# Patient Record
Sex: FEMALE | Race: WHITE | NOT HISPANIC OR LATINO | Employment: OTHER | URBAN - NONMETROPOLITAN AREA
[De-identification: names, ages, dates, MRNs, and addresses within clinical notes are randomized per-mention and may not be internally consistent; named-entity substitution may affect disease eponyms.]

---

## 2017-02-27 ENCOUNTER — TRANSCRIBE ORDERS (OUTPATIENT)
Dept: ADMINISTRATIVE | Facility: HOSPITAL | Age: 62
End: 2017-02-27

## 2017-02-27 DIAGNOSIS — R10.11 RIGHT UPPER QUADRANT PAIN: Primary | ICD-10-CM

## 2017-03-08 ENCOUNTER — TELEPHONE (OUTPATIENT)
Dept: MAMMOGRAPHY | Facility: HOSPITAL | Age: 62
End: 2017-03-08

## 2017-03-10 ENCOUNTER — HOSPITAL ENCOUNTER (OUTPATIENT)
Dept: ULTRASOUND IMAGING | Facility: HOSPITAL | Age: 62
Discharge: HOME | End: 2017-03-10
Admitting: PHYSICIAN ASSISTANT

## 2017-03-10 DIAGNOSIS — R10.11 RIGHT UPPER QUADRANT PAIN: ICD-10-CM

## 2017-03-10 PROCEDURE — 76705 ECHO EXAM OF ABDOMEN: CPT | Performed by: RADIOLOGY

## 2017-03-10 PROCEDURE — 76705 ECHO EXAM OF ABDOMEN: CPT

## 2017-07-26 RX ORDER — NAPROXEN 500 MG/1
TABLET ORAL
Refills: 0 | Status: ON HOLD | COMMUNITY
Start: 2017-05-15 | End: 2018-03-01

## 2017-07-26 RX ORDER — TIZANIDINE 4 MG/1
4 TABLET ORAL EVERY 8 HOURS PRN
Refills: 0 | COMMUNITY
Start: 2017-04-22

## 2017-07-27 ENCOUNTER — OFFICE VISIT (OUTPATIENT)
Dept: NEUROSURGERY | Facility: CLINIC | Age: 62
End: 2017-07-27

## 2017-07-27 VITALS
DIASTOLIC BLOOD PRESSURE: 95 MMHG | WEIGHT: 193 LBS | RESPIRATION RATE: 18 BRPM | BODY MASS INDEX: 31.02 KG/M2 | OXYGEN SATURATION: 97 % | SYSTOLIC BLOOD PRESSURE: 145 MMHG | HEIGHT: 66 IN | TEMPERATURE: 98.4 F | HEART RATE: 82 BPM

## 2017-07-27 DIAGNOSIS — M50.30 DEGENERATIVE DISC DISEASE, CERVICAL: Primary | ICD-10-CM

## 2017-07-27 PROCEDURE — 99203 OFFICE O/P NEW LOW 30 MIN: CPT | Performed by: NEUROLOGICAL SURGERY

## 2017-07-27 RX ORDER — CHOLECALCIFEROL (VITAMIN D3) 125 MCG
5 CAPSULE ORAL NIGHTLY
COMMUNITY

## 2017-07-27 RX ORDER — NABUMETONE 750 MG/1
750 TABLET, FILM COATED ORAL 2 TIMES DAILY
Qty: 60 TABLET | Refills: 0 | Status: ON HOLD | OUTPATIENT
Start: 2017-07-27 | End: 2018-03-01

## 2017-07-27 NOTE — PROGRESS NOTES
Annette Suarez  1955  [id number]      Chief Complaint   Patient presents with   • Neck Pain       HISTORY OF PRESENT ILLNESS:  [This is a 62-year-old who presents with a genetic history of degenerative osteoarthritis complaining of pain in the cervical area which is axial and nonradicular and not that associated with spinal cord or nerve root compression.  In the past she went to physical therapy which helped only temporarily.  CT scan was repeated and she is referred for neurosurgical consultation. ]    Past Medical History:   Diagnosis Date   • Arthritis    • Asthma    • Diabetic peripheral neuropathy    • Esophageal reflux    • Gout    • Hyperlipidemia    • Hypertension        Past Surgical History:   Procedure Laterality Date   •  SECTION     • HYSTERECTOMY         Family History   Problem Relation Age of Onset   • Diabetes Other    • Hypertension Other        Social History     Social History   • Marital status: Single     Spouse name: N/A   • Number of children: N/A   • Years of education: N/A     Occupational History   • Not on file.     Social History Main Topics   • Smoking status: Former Smoker   • Smokeless tobacco: Not on file   • Alcohol use No   • Drug use: No   • Sexual activity: Defer     Other Topics Concern   • Not on file     Social History Narrative       Allergies   Allergen Reactions   • Meperidine    • Morphine          Current Outpatient Prescriptions:   •  allopurinol (ZYLOPRIM) 100 MG tablet, take 2 tablets by mouth once daily, Disp: , Rfl: 0  •  aspirin 81 MG tablet, Take  by mouth., Disp: , Rfl:   •  cloNIDine (CATAPRES) 0.1 MG tablet, , Disp: , Rfl: 0  •  furosemide (LASIX) 20 MG tablet, take 1 tablet by mouth once daily, Disp: , Rfl: 0  •  losartan (COZAAR) 100 MG tablet, Take  by mouth., Disp: , Rfl:   •  melatonin 5 MG tablet tablet, Take 5 mg by mouth., Disp: , Rfl:   •  metFORMIN (GLUCOPHAGE) 500 MG tablet, Take  by mouth., Disp: , Rfl:   •  naproxen (NAPROSYN) 500  MG tablet, take 1 tablet by mouth twice a day with food, Disp: , Rfl: 0  •  tiZANidine (ZANAFLEX) 2 MG tablet, , Disp: , Rfl: 0  •  allopurinol (ZYLOPRIM) 300 MG tablet, Take  by mouth., Disp: , Rfl:   •  atenolol (TENORMIN) 50 MG tablet, Take  by mouth., Disp: , Rfl:   •  beclomethasone (QVAR) 80 MCG/ACT inhaler, Qvar AERS; Patient Sig: Qvar AERS ; 0; 13-Nov-2015; Active, Disp: , Rfl:   •  cetirizine (ZyrTEC) 10 MG tablet, Take  by mouth., Disp: , Rfl:   •  diclofenac (VOLTAREN) 1 % gel gel, apply to affected area three times a day if needed, Disp: , Rfl: 0  •  diclofenac (VOLTAREN) 75 MG EC tablet, , Disp: , Rfl: 0  •  diltiazem SR (CARDIZEM SR) 120 MG 12 hr capsule, take 1 capsule by mouth twice a day, Disp: , Rfl: 0  •  fenofibrate micronized (ANTARA) 130 MG capsule, Take  by mouth., Disp: , Rfl:   •  fluticasone (FLONASE) 50 MCG/ACT nasal spray, into each nostril., Disp: , Rfl:   •  glimepiride (AMARYL) 1 MG tablet, Take  by mouth., Disp: , Rfl:   •  HYDROcodone-acetaminophen (NORCO) 5-325 MG per tablet, take 1 tablet by mouth every 6 hours if needed, Disp: , Rfl: 0  •  hydrOXYzine (VISTARIL) 25 MG capsule, Take  by mouth., Disp: , Rfl:   •  ibuprofen (ADVIL,MOTRIN) 800 MG tablet, Take  by mouth., Disp: , Rfl:   •  indomethacin (INDOCIN) 25 MG capsule, Take  by mouth., Disp: , Rfl:   •  PredniSONE (DELTASONE) 10 MG (21) tablet pack, Take  by mouth., Disp: , Rfl:   •  ranitidine (ZANTAC) 150 MG tablet, Take  by mouth., Disp: , Rfl:   •  tiZANidine (ZANAFLEX) 4 MG tablet, , Disp: , Rfl: 0  •  TiZANidine (ZANAFLEX) 6 MG capsule, Take  by mouth., Disp: , Rfl:     Review of Systems   Constitutional: Positive for chills.   HENT: Positive for hearing loss, postnasal drip, rhinorrhea, sinus pressure and sneezing.    Eyes: Positive for photophobia and itching.   Respiratory: Positive for shortness of breath.    Cardiovascular: Positive for palpitations.   Endocrine: Positive for cold intolerance.   Musculoskeletal:  Positive for arthralgias, back pain, neck pain and neck stiffness.   Allergic/Immunologic: Positive for environmental allergies.   Hematological: Bruises/bleeds easily.   Psychiatric/Behavioral: Positive for agitation.   All other systems reviewed and are negative.      There were no vitals filed for this visit.    Neurological Examination:      Mental status/speech: The patient is alert and oriented.  Speech is clear without aphysia or dysarthria.  No overt cognitive deficits.    Cranial nerve examination:    Olfaction: Smell is intact.  Vision: Vision is intact without visual field abnormalities.  Funduscopic examination is normal.  No pupillary irregularity.  Ocular motor examination: The extraocular muscles are intact.  There is no diplopia.  The pupil is round and reactive to both light and accommodation.  There is no nystagmus.  Facial movement/sensation: There is no facial weakness.  Sensation is intact in the first, second, and third divisions of the trigeminal nerve.  The corneal reflex is intact.  Auditory: Hearing is intact to finger rub bilaterally.  Cranial nerves IX, X, XI, XII: Phonation is normal.  No dysphagia.  Tongue is protruded in the midline without atrophy.  The gag reflex is intact.  Shoulder shrug is normal.    Musculoligamentous ligamentous examination: [She had decreased range of motion of cervical spine.  However there is no weakness sensory loss or reflex asymmetry.  Her gait is normal.  No Babinski Inocencia or clonus.  Straight leg raising [place] and flip test are negative. ]      Medical Decision Making:     Diagnostic Data Set:  Cervical MRI data set shows multilevel disc degeneration presenting as bulge of intravertebral disc without significant compromise of the canal or neuroforamen.  Although she has significant degenerative changes at his generalized diffuse and not focal.      Assessment:  Diffuse degenerative osteoarthritis of the cervical spine          Recommendations:   Surgery is not indicated.  She has been to physical therapy in the past and states it helped but only temporarily.  Therefore revisit to physical therapy may not be in her best interest.  I have given her prescription of Relafen 750 mg twice a day to see if this NSAID will help.  Unfortunately little else to offer her other than alteration of her medications.  Referral to pain management is a consideration for facet or epidural steroid blocks.        I greatly appreciate the opportunity to see and evaluate this individual.  If you have questions or concerns regarding issues that I may have overlooked please call me at any time: [phone].  Moshe Thomas M.D.  [facility]  [address] [place] [address].  [facility]  [id number]

## 2017-08-25 ENCOUNTER — TELEPHONE (OUTPATIENT)
Dept: NEUROSURGERY | Facility: CLINIC | Age: 62
End: 2017-08-25

## 2017-08-25 DIAGNOSIS — M54.2 CERVICALGIA: Primary | ICD-10-CM

## 2018-02-28 ENCOUNTER — HOSPITAL ENCOUNTER (INPATIENT)
Facility: HOSPITAL | Age: 63
LOS: 1 days | Discharge: HOME | End: 2018-03-04
Attending: INTERNAL MEDICINE | Admitting: INTERNAL MEDICINE

## 2018-02-28 DIAGNOSIS — I25.9 CHEST PAIN DUE TO MYOCARDIAL ISCHEMIA, UNSPECIFIED ISCHEMIC CHEST PAIN TYPE: Primary | ICD-10-CM

## 2018-03-01 ENCOUNTER — APPOINTMENT (OUTPATIENT)
Dept: CARDIOLOGY | Facility: HOSPITAL | Age: 63
End: 2018-03-01
Attending: INTERNAL MEDICINE

## 2018-03-01 ENCOUNTER — APPOINTMENT (OUTPATIENT)
Dept: GENERAL RADIOLOGY | Facility: HOSPITAL | Age: 63
End: 2018-03-01

## 2018-03-01 PROBLEM — R07.9 CHEST PAIN: Status: ACTIVE | Noted: 2018-03-01

## 2018-03-01 LAB
ALBUMIN SERPL-MCNC: 4.5 G/DL (ref 3.4–4.8)
ALBUMIN/GLOB SERPL: 1.6 G/DL (ref 1.5–2.5)
ALP SERPL-CCNC: 63 U/L (ref 35–104)
ALT SERPL W P-5'-P-CCNC: 16 U/L (ref 10–36)
ANION GAP SERPL CALCULATED.3IONS-SCNC: 9.1 MMOL/L (ref 3.6–11.2)
AST SERPL-CCNC: 17 U/L (ref 10–30)
BASOPHILS # BLD AUTO: 0.03 10*3/MM3 (ref 0–0.3)
BASOPHILS NFR BLD AUTO: 0.3 % (ref 0–2)
BH CV ECHO MEAS - % IVS THICK: 27 %
BH CV ECHO MEAS - % LVPW THICK: 48.8 %
BH CV ECHO MEAS - ACS: 2.1 CM
BH CV ECHO MEAS - AI DEC SLOPE: 148.3 CM/SEC^2
BH CV ECHO MEAS - AI MAX PG: 45.3 MMHG
BH CV ECHO MEAS - AI MAX VEL: 336.6 CM/SEC
BH CV ECHO MEAS - AI P1/2T: 665 MSEC
BH CV ECHO MEAS - AO MAX PG (FULL): 3.3 MMHG
BH CV ECHO MEAS - AO MAX PG: 7.5 MMHG
BH CV ECHO MEAS - AO MEAN PG (FULL): 1.6 MMHG
BH CV ECHO MEAS - AO MEAN PG: 3.9 MMHG
BH CV ECHO MEAS - AO ROOT AREA (BSA CORRECTED): 1.9
BH CV ECHO MEAS - AO ROOT AREA: 11.5 CM^2
BH CV ECHO MEAS - AO ROOT DIAM: 3.8 CM
BH CV ECHO MEAS - AO V2 MAX: 137.1 CM/SEC
BH CV ECHO MEAS - AO V2 MEAN: 90 CM/SEC
BH CV ECHO MEAS - AO V2 VTI: 26.3 CM
BH CV ECHO MEAS - AVA(I,A): 2.5 CM^2
BH CV ECHO MEAS - AVA(I,D): 2.5 CM^2
BH CV ECHO MEAS - AVA(V,A): 2.5 CM^2
BH CV ECHO MEAS - AVA(V,D): 2.5 CM^2
BH CV ECHO MEAS - BSA(HAYCOCK): 2.1 M^2
BH CV ECHO MEAS - BSA: 2 M^2
BH CV ECHO MEAS - BZI_BMI: 31.8 KILOGRAMS/M^2
BH CV ECHO MEAS - BZI_METRIC_HEIGHT: 167.6 CM
BH CV ECHO MEAS - BZI_METRIC_WEIGHT: 89.4 KG
BH CV ECHO MEAS - EDV(CUBED): 258.8 ML
BH CV ECHO MEAS - EDV(TEICH): 206.5 ML
BH CV ECHO MEAS - EF(CUBED): 69.8 %
BH CV ECHO MEAS - EF(TEICH): 60.3 %
BH CV ECHO MEAS - ESV(CUBED): 78.1 ML
BH CV ECHO MEAS - ESV(TEICH): 81.9 ML
BH CV ECHO MEAS - FS: 32.9 %
BH CV ECHO MEAS - IVS/LVPW: 0.86
BH CV ECHO MEAS - IVSD: 1.5 CM
BH CV ECHO MEAS - IVSS: 1.9 CM
BH CV ECHO MEAS - LA DIMENSION: 3.7 CM
BH CV ECHO MEAS - LA/AO: 0.96
BH CV ECHO MEAS - LV MASS(C)D: 521.2 GRAMS
BH CV ECHO MEAS - LV MASS(C)DI: 262.2 GRAMS/M^2
BH CV ECHO MEAS - LV MASS(C)S: 493.3 GRAMS
BH CV ECHO MEAS - LV MASS(C)SI: 248.2 GRAMS/M^2
BH CV ECHO MEAS - LV MAX PG: 4.3 MMHG
BH CV ECHO MEAS - LV MEAN PG: 2.3 MMHG
BH CV ECHO MEAS - LV V1 MAX: 103.2 CM/SEC
BH CV ECHO MEAS - LV V1 MEAN: 67.5 CM/SEC
BH CV ECHO MEAS - LV V1 VTI: 20 CM
BH CV ECHO MEAS - LVIDD: 6.4 CM
BH CV ECHO MEAS - LVIDS: 4.3 CM
BH CV ECHO MEAS - LVOT AREA (M): 3.1 CM^2
BH CV ECHO MEAS - LVOT AREA: 3.3 CM^2
BH CV ECHO MEAS - LVOT DIAM: 2 CM
BH CV ECHO MEAS - LVPWD: 1.7 CM
BH CV ECHO MEAS - LVPWS: 2.6 CM
BH CV ECHO MEAS - MV A MAX VEL: 104.6 CM/SEC
BH CV ECHO MEAS - MV DEC SLOPE: 314.2 CM/SEC^2
BH CV ECHO MEAS - MV E MAX VEL: 64.2 CM/SEC
BH CV ECHO MEAS - MV E/A: 0.61
BH CV ECHO MEAS - MV P1/2T MAX VEL: 64.7 CM/SEC
BH CV ECHO MEAS - MV P1/2T: 60.3 MSEC
BH CV ECHO MEAS - MVA P1/2T LCG: 3.4 CM^2
BH CV ECHO MEAS - MVA(P1/2T): 3.6 CM^2
BH CV ECHO MEAS - PA ACC SLOPE: 1077 CM/SEC^2
BH CV ECHO MEAS - PA ACC TIME: 0.1 SEC
BH CV ECHO MEAS - PA PR(ACCEL): 33.1 MMHG
BH CV ECHO MEAS - RAP SYSTOLE: 10 MMHG
BH CV ECHO MEAS - RVSP: 37.5 MMHG
BH CV ECHO MEAS - SI(AO): 152.6 ML/M^2
BH CV ECHO MEAS - SI(CUBED): 90.9 ML/M^2
BH CV ECHO MEAS - SI(LVOT): 32.8 ML/M^2
BH CV ECHO MEAS - SI(TEICH): 62.7 ML/M^2
BH CV ECHO MEAS - SV(AO): 303.3 ML
BH CV ECHO MEAS - SV(CUBED): 180.6 ML
BH CV ECHO MEAS - SV(LVOT): 65.3 ML
BH CV ECHO MEAS - SV(TEICH): 124.5 ML
BH CV ECHO MEAS - TR MAX VEL: 262 CM/SEC
BILIRUB SERPL-MCNC: 0.5 MG/DL (ref 0.2–1.8)
BNP SERPL-MCNC: 707 PG/ML (ref 0–100)
BUN BLD-MCNC: 26 MG/DL (ref 7–21)
BUN/CREAT SERPL: 21.8 (ref 7–25)
CALCIUM SPEC-SCNC: 9.8 MG/DL (ref 7.7–10)
CHLORIDE SERPL-SCNC: 103 MMOL/L (ref 99–112)
CHOLEST SERPL-MCNC: 238 MG/DL (ref 0–200)
CK MB SERPL-CCNC: 1.13 NG/ML (ref 0–5)
CK MB SERPL-CCNC: 1.26 NG/ML (ref 0–5)
CK MB SERPL-RTO: 1.7 % (ref 0–3)
CK SERPL-CCNC: 73 U/L (ref 24–173)
CK SERPL-CCNC: 76 U/L (ref 24–173)
CO2 SERPL-SCNC: 25.9 MMOL/L (ref 24.3–31.9)
CREAT BLD-MCNC: 1.19 MG/DL (ref 0.43–1.29)
DEPRECATED RDW RBC AUTO: 42.2 FL (ref 37–54)
EOSINOPHIL # BLD AUTO: 0.15 10*3/MM3 (ref 0–0.7)
EOSINOPHIL NFR BLD AUTO: 1.6 % (ref 0–5)
ERYTHROCYTE [DISTWIDTH] IN BLOOD BY AUTOMATED COUNT: 12.9 % (ref 11.5–14.5)
GFR SERPL CREATININE-BSD FRML MDRD: 46 ML/MIN/1.73
GLOBULIN UR ELPH-MCNC: 2.9 GM/DL
GLUCOSE BLD-MCNC: 95 MG/DL (ref 70–110)
GLUCOSE BLDC GLUCOMTR-MCNC: 90 MG/DL (ref 70–130)
GLUCOSE BLDC GLUCOMTR-MCNC: 93 MG/DL (ref 70–130)
HCT VFR BLD AUTO: 35.8 % (ref 37–47)
HDLC SERPL-MCNC: 42 MG/DL (ref 60–100)
HGB BLD-MCNC: 11.3 G/DL (ref 12–16)
IMM GRANULOCYTES # BLD: 0.03 10*3/MM3 (ref 0–0.03)
IMM GRANULOCYTES NFR BLD: 0.3 % (ref 0–0.5)
LDLC SERPL CALC-MCNC: 145 MG/DL (ref 0–100)
LDLC/HDLC SERPL: 3.45 {RATIO}
LYMPHOCYTES # BLD AUTO: 3.01 10*3/MM3 (ref 1–3)
LYMPHOCYTES NFR BLD AUTO: 32.3 % (ref 21–51)
MAXIMAL PREDICTED HEART RATE: 160 BPM
MCH RBC QN AUTO: 29.4 PG (ref 27–33)
MCHC RBC AUTO-ENTMCNC: 31.6 G/DL (ref 33–37)
MCV RBC AUTO: 93.2 FL (ref 80–94)
MONOCYTES # BLD AUTO: 0.76 10*3/MM3 (ref 0.1–0.9)
MONOCYTES NFR BLD AUTO: 8.1 % (ref 0–10)
NEUTROPHILS # BLD AUTO: 5.35 10*3/MM3 (ref 1.4–6.5)
NEUTROPHILS NFR BLD AUTO: 57.4 % (ref 30–70)
OSMOLALITY SERPL CALC.SUM OF ELEC: 280.2 MOSM/KG (ref 273–305)
PLATELET # BLD AUTO: 280 10*3/MM3 (ref 130–400)
PMV BLD AUTO: 10.5 FL (ref 6–10)
POTASSIUM BLD-SCNC: 3.7 MMOL/L (ref 3.5–5.3)
PROT SERPL-MCNC: 7.4 G/DL (ref 6–8)
RBC # BLD AUTO: 3.84 10*6/MM3 (ref 4.2–5.4)
SODIUM BLD-SCNC: 138 MMOL/L (ref 135–153)
STRESS TARGET HR: 136 BPM
TRIGL SERPL-MCNC: 255 MG/DL (ref 0–150)
TROPONIN I SERPL-MCNC: 0.06 NG/ML
TROPONIN I SERPL-MCNC: 0.07 NG/ML
TROPONIN I SERPL-MCNC: 0.09 NG/ML
TROPONIN I SERPL-MCNC: 0.1 NG/ML
TROPONIN I SERPL-MCNC: 0.12 NG/ML
VLDLC SERPL-MCNC: 51 MG/DL
WBC NRBC COR # BLD: 9.33 10*3/MM3 (ref 4.5–12.5)

## 2018-03-01 PROCEDURE — G0009 ADMIN PNEUMOCOCCAL VACCINE: HCPCS | Performed by: INTERNAL MEDICINE

## 2018-03-01 PROCEDURE — 94799 UNLISTED PULMONARY SVC/PX: CPT

## 2018-03-01 PROCEDURE — 90732 PPSV23 VACC 2 YRS+ SUBQ/IM: CPT | Performed by: INTERNAL MEDICINE

## 2018-03-01 PROCEDURE — 25010000002 PNEUMOCOCCAL VAC POLYVALENT PER 0.5 ML: Performed by: INTERNAL MEDICINE

## 2018-03-01 PROCEDURE — 93010 ELECTROCARDIOGRAM REPORT: CPT | Performed by: INTERNAL MEDICINE

## 2018-03-01 PROCEDURE — 80061 LIPID PANEL: CPT | Performed by: INTERNAL MEDICINE

## 2018-03-01 PROCEDURE — 82550 ASSAY OF CK (CPK): CPT | Performed by: INTERNAL MEDICINE

## 2018-03-01 PROCEDURE — 80053 COMPREHEN METABOLIC PANEL: CPT | Performed by: INTERNAL MEDICINE

## 2018-03-01 PROCEDURE — 74245: CPT

## 2018-03-01 PROCEDURE — 93306 TTE W/DOPPLER COMPLETE: CPT

## 2018-03-01 PROCEDURE — 86141 C-REACTIVE PROTEIN HS: CPT | Performed by: INTERNAL MEDICINE

## 2018-03-01 PROCEDURE — 82553 CREATINE MB FRACTION: CPT | Performed by: INTERNAL MEDICINE

## 2018-03-01 PROCEDURE — 85025 COMPLETE CBC W/AUTO DIFF WBC: CPT | Performed by: INTERNAL MEDICINE

## 2018-03-01 PROCEDURE — 83880 ASSAY OF NATRIURETIC PEPTIDE: CPT | Performed by: INTERNAL MEDICINE

## 2018-03-01 PROCEDURE — G0378 HOSPITAL OBSERVATION PER HR: HCPCS

## 2018-03-01 PROCEDURE — 74245 FL UPPER GI SINGLE CONTRAST SBFT: CPT | Performed by: RADIOLOGY

## 2018-03-01 PROCEDURE — 25010000002 ENOXAPARIN PER 10 MG: Performed by: INTERNAL MEDICINE

## 2018-03-01 PROCEDURE — 82962 GLUCOSE BLOOD TEST: CPT

## 2018-03-01 PROCEDURE — 93005 ELECTROCARDIOGRAM TRACING: CPT | Performed by: INTERNAL MEDICINE

## 2018-03-01 PROCEDURE — 84484 ASSAY OF TROPONIN QUANT: CPT | Performed by: INTERNAL MEDICINE

## 2018-03-01 RX ORDER — ASPIRIN 81 MG/1
81 TABLET ORAL DAILY
Status: CANCELLED | OUTPATIENT
Start: 2018-03-01

## 2018-03-01 RX ORDER — ALLOPURINOL 100 MG/1
100 TABLET ORAL DAILY
Status: CANCELLED | OUTPATIENT
Start: 2018-03-01

## 2018-03-01 RX ORDER — CLOPIDOGREL BISULFATE 75 MG/1
75 TABLET ORAL DAILY
Status: DISCONTINUED | OUTPATIENT
Start: 2018-03-01 | End: 2018-03-02

## 2018-03-01 RX ORDER — PANTOPRAZOLE SODIUM 40 MG/1
40 TABLET, DELAYED RELEASE ORAL
Status: DISCONTINUED | OUTPATIENT
Start: 2018-03-01 | End: 2018-03-02

## 2018-03-01 RX ORDER — ALLOPURINOL 100 MG/1
100 TABLET ORAL NIGHTLY
Status: DISCONTINUED | OUTPATIENT
Start: 2018-03-01 | End: 2018-03-04 | Stop reason: HOSPADM

## 2018-03-01 RX ORDER — ALLOPURINOL 100 MG/1
100 TABLET ORAL NIGHTLY
COMMUNITY

## 2018-03-01 RX ORDER — FLECAINIDE ACETATE 100 MG/1
100 TABLET ORAL 2 TIMES DAILY
COMMUNITY
End: 2018-03-04 | Stop reason: HOSPADM

## 2018-03-01 RX ORDER — FLECAINIDE ACETATE 50 MG/1
100 TABLET ORAL 2 TIMES DAILY
Status: DISCONTINUED | OUTPATIENT
Start: 2018-03-01 | End: 2018-03-02

## 2018-03-01 RX ORDER — LOSARTAN POTASSIUM 50 MG/1
100 TABLET ORAL DAILY
Status: DISCONTINUED | OUTPATIENT
Start: 2018-03-01 | End: 2018-03-04 | Stop reason: HOSPADM

## 2018-03-01 RX ORDER — TIZANIDINE 4 MG/1
4 TABLET ORAL EVERY 8 HOURS PRN
Status: DISCONTINUED | OUTPATIENT
Start: 2018-03-01 | End: 2018-03-04 | Stop reason: HOSPADM

## 2018-03-01 RX ORDER — NAPROXEN 500 MG/1
500 TABLET ORAL 2 TIMES DAILY PRN
COMMUNITY
End: 2018-03-04 | Stop reason: HOSPADM

## 2018-03-01 RX ORDER — NITROGLYCERIN 0.4 MG/1
0.4 TABLET SUBLINGUAL
Status: DISCONTINUED | OUTPATIENT
Start: 2018-03-01 | End: 2018-03-01 | Stop reason: SDUPTHER

## 2018-03-01 RX ORDER — LOSARTAN POTASSIUM 100 MG/1
100 TABLET ORAL DAILY
COMMUNITY

## 2018-03-01 RX ORDER — CLONIDINE HYDROCHLORIDE 0.1 MG/1
0.1 TABLET ORAL 2 TIMES DAILY
Status: DISCONTINUED | OUTPATIENT
Start: 2018-03-01 | End: 2018-03-02

## 2018-03-01 RX ORDER — NITROGLYCERIN 0.4 MG/1
0.4 TABLET SUBLINGUAL
Status: DISCONTINUED | OUTPATIENT
Start: 2018-03-01 | End: 2018-03-04 | Stop reason: HOSPADM

## 2018-03-01 RX ORDER — ASPIRIN 81 MG/1
81 TABLET, CHEWABLE ORAL DAILY
Status: DISCONTINUED | OUTPATIENT
Start: 2018-03-01 | End: 2018-03-04 | Stop reason: HOSPADM

## 2018-03-01 RX ORDER — NAPROXEN 250 MG/1
500 TABLET ORAL 2 TIMES DAILY PRN
Status: DISCONTINUED | OUTPATIENT
Start: 2018-03-01 | End: 2018-03-02

## 2018-03-01 RX ORDER — FUROSEMIDE 20 MG/1
20 TABLET ORAL DAILY
Status: DISCONTINUED | OUTPATIENT
Start: 2018-03-01 | End: 2018-03-04 | Stop reason: HOSPADM

## 2018-03-01 RX ADMIN — PANTOPRAZOLE SODIUM 40 MG: 40 TABLET, DELAYED RELEASE ORAL at 12:23

## 2018-03-01 RX ADMIN — FLECAINIDE ACETATE 100 MG: 50 TABLET ORAL at 10:02

## 2018-03-01 RX ADMIN — ALLOPURINOL 100 MG: 100 TABLET ORAL at 19:56

## 2018-03-01 RX ADMIN — LOSARTAN POTASSIUM 100 MG: 50 TABLET, FILM COATED ORAL at 12:23

## 2018-03-01 RX ADMIN — ASPIRIN 81 MG: 81 TABLET, CHEWABLE ORAL at 12:25

## 2018-03-01 RX ADMIN — FLECAINIDE ACETATE 100 MG: 50 TABLET ORAL at 19:56

## 2018-03-01 RX ADMIN — CLOPIDOGREL 75 MG: 75 TABLET, FILM COATED ORAL at 12:23

## 2018-03-01 RX ADMIN — CLONIDINE HYDROCHLORIDE 0.1 MG: 0.1 TABLET ORAL at 12:24

## 2018-03-01 RX ADMIN — METOPROLOL TARTRATE 25 MG: 25 TABLET, FILM COATED ORAL at 01:32

## 2018-03-01 RX ADMIN — ENOXAPARIN SODIUM 90 MG: 100 INJECTION SUBCUTANEOUS at 17:57

## 2018-03-01 RX ADMIN — NAPROXEN 500 MG: 250 TABLET ORAL at 20:00

## 2018-03-01 RX ADMIN — PNEUMOCOCCAL VACCINE POLYVALENT 0.5 ML
25; 25; 25; 25; 25; 25; 25; 25; 25; 25; 25; 25; 25; 25; 25; 25; 25; 25; 25; 25; 25; 25; 25 INJECTION, SOLUTION INTRAMUSCULAR; SUBCUTANEOUS at 12:31

## 2018-03-01 RX ADMIN — METFORMIN HYDROCHLORIDE 500 MG: 500 TABLET ORAL at 12:25

## 2018-03-01 RX ADMIN — CLONIDINE HYDROCHLORIDE 0.1 MG: 0.1 TABLET ORAL at 19:56

## 2018-03-01 RX ADMIN — METOPROLOL TARTRATE 25 MG: 25 TABLET, FILM COATED ORAL at 12:24

## 2018-03-01 RX ADMIN — FUROSEMIDE 20 MG: 20 TABLET ORAL at 12:24

## 2018-03-01 RX ADMIN — FLECAINIDE ACETATE 100 MG: 50 TABLET ORAL at 01:32

## 2018-03-01 RX ADMIN — ENOXAPARIN SODIUM 40 MG: 40 INJECTION SUBCUTANEOUS at 01:32

## 2018-03-01 RX ADMIN — TIZANIDINE 4 MG: 4 TABLET ORAL at 20:00

## 2018-03-01 RX ADMIN — ALLOPURINOL 100 MG: 100 TABLET ORAL at 01:33

## 2018-03-01 RX ADMIN — METOPROLOL TARTRATE 25 MG: 25 TABLET, FILM COATED ORAL at 19:56

## 2018-03-01 NOTE — PROGRESS NOTES
Discharge Planning Assessment   [place]     Patient Name: Annette Suarez  MRN: [id number]  Today's Date: 3/1/2018    Admit Date: 2/28/2018          Discharge Needs Assessment       03/01/18 1034    Living Environment    Lives With child(tato), dependent    Living Arrangements house    Home Accessibility no concerns    Stair Railings at Home none    Type of Financial/Environmental Concern none    Transportation Available car            Discharge Plan       03/01/18 1035    Case Management/Social Work Plan    Plan Pt admitted on 2/28/18.  SS received consult per nsg for discharge planning.  SS spoke with pt on this date.  Pt lives at home with handicap son and plans to return home at discharge.  Pt currently does not utilize home health or DME.  Pt's PCP is Dr. Adams.  Pt utilizes [facility].  Pt states no needs.  SS will follow and assist with discharge needs.     Patient/Family In Agreement With Plan yes        Discharge Placement     No information found                Demographic Summary       03/01/18 1034    Referral Information    Admission Type observation    Referral Source nursing    Reason For Consult discharge planning            Functional Status     None            Psychosocial     None            Abuse/Neglect     None            Legal     None            Substance Abuse     None            Patient Forms     None          Barbi Leyva

## 2018-03-01 NOTE — PLAN OF CARE
Problem: Patient Care Overview (Adult)  Goal: Plan of Care Review  Outcome: Ongoing (interventions implemented as appropriate)    Goal: Adult Individualization and Mutuality  Outcome: Ongoing (interventions implemented as appropriate)    Goal: Discharge Needs Assessment  Outcome: Ongoing (interventions implemented as appropriate)      Problem: Fall Risk (Adult)  Goal: Identify Related Risk Factors and Signs and Symptoms  Outcome: Ongoing (interventions implemented as appropriate)    Goal: Absence of Falls  Outcome: Ongoing (interventions implemented as appropriate)      Problem: Skin Integrity Impairment, Risk/Actual (Adult)  Goal: Identify Related Risk Factors and Signs and Symptoms  Outcome: Ongoing (interventions implemented as appropriate)    Goal: Skin Integrity/Wound Healing  Outcome: Ongoing (interventions implemented as appropriate)      Problem: Pain, Acute (Adult)  Goal: Identify Related Risk Factors and Signs and Symptoms  Outcome: Ongoing (interventions implemented as appropriate)    Goal: Acceptable Pain Control/Comfort Level  Outcome: Ongoing (interventions implemented as appropriate)      Problem: Diabetes, Type 2 (Adult)  Goal: Signs and Symptoms of Listed Potential Problems Will be Absent or Manageable (Diabetes, Type 2)  Outcome: Ongoing (interventions implemented as appropriate)

## 2018-03-01 NOTE — NURSING NOTE
Pt states she is scheduled for gallbladder sx on Wednesday, 3/7/18, with Justin Mccabe in [place].

## 2018-03-01 NOTE — PROGRESS NOTES
Primary care provider: Dr. Jonathan Kelly    Admitting physician-Dr. Lin    Chief complaint:    Chest tightness      History of present illness:      62-year-old woman has been admitted with history of chest tightness.    She developed chest pain yesterday morning which is retrosternal,, tightness, moderate in intensity, intermittent, with no definite aggravating or relieving factors and no radiation.  She visited the emergency department at [facility] from that she was transferred here for further care.  Her troponin level was indeterminate-0.06.  ECG showed normal sinus rhythm no evidence of ischemia.  After admission to discuss acidity, she remained chest pain-free.  No history of associated nausea or vomiting.  No history of diaphoresis.    Effort tolerance is good.  No history of PND, orthopnea or leg edema.  She has history of occasional palpitations due to PVCs.  No history of dizziness.    She has history of PVCs for which she takes flecainide.  No history of known coronary artery disease or CHF.  A nuclear stress test done in 8/2017 showed no evidence of ischemia and LVEF was normal.  Echocardiogram done 7/2017 showed normal LVEF and wall motion and no evidence of significant valvular heart disease.    Cardiac risk factors include history of DM type II, hypertension and hyperlipidemia.  History of COPD.  She quit smoking several years ago.  Recently diagnosed with a polyp in the gallbladder and been scheduled for gallbladder surgery next week by Dr. Mccabe [facility].        Review of Systems    Constitutional-fatigue    ENT-none  Cardiovascular-as above  Respiratooy- no symptoms.  History of COPD   GI-reflux disease   Musculoskeletal-gout and backache  Endocrine-diabetes and lipid disorder  Other organ system involvement-negative      Past Medical History:   Diagnosis Date   • Arthritis    • Asthma    • Diabetic peripheral neuropathy    • Esophageal reflux    • Gout    • Hyperlipidemia   "  • Hypertension    , Past Surgical History:   Procedure Laterality Date   •  SECTION     • HYSTERECTOMY     , Family History   Problem Relation Age of Onset   • Diabetes Other    • Hypertension Other    , Social History   Substance Use Topics   • Smoking status: Former Smoker   • Smokeless tobacco: Not on file   • Alcohol use No        Medication Review:    allopurinol (ZYLOPRIM) tablet 100 mg 100 mg, PO, Nightly Given: 133        aspirin chewable tablet 81 mg 81 mg, PO, Daily Ordered       CloNIDine (CATAPRES) tablet 0.1 mg 0.1 mg, PO, BID Ordered       enoxaparin (LOVENOX) syringe 40 mg 40 mg, SC, Q24H Given: 132       flecainide (TAMBOCOR) tablet 100 mg 100 mg, PO, BID Given: 132       furosemide (LASIX) tablet 20 mg 20 mg, PO, Daily Ordered       losartan (COZAAR) tablet 100 mg 100 mg, PO, Daily Ordered       metFORMIN (GLUCOPHAGE) tablet 500 mg 500 mg, PO, Daily With Breakfast Ordered       metoprolol tartrate (LOPRESSOR) tablet 25 mg 25 mg, PO, Q12H Given: 132       pneumococcal polysaccharide 23-valent (PNEUMOVAX-23) vaccine 0.5 mL 0.5 mL, IM, Once Ordered                 Allergies:  Meperidine; Morphine; and Adhesive tape     Past surgical history-hysterectomy and knee surgery     Social history-former smoker.  No history of fall: Or substance abuse     Family history-mother has history of heart disease       Objective     Vital Sign Min/Max for last 24 hours  Temp  Min: 98.1 °F (36.7 °C)  Max: 98.8 °F (37.1 °C)   BP  Min: 117/71  Max: 160/93   Pulse  Min: 65  Max: 91   Resp  Min: 18  Max: 20   SpO2  Min: 94 %  Max: 98 %   No Data Recorded   Weight  Min: 90.9 kg (200 lb 8 oz)  Max: 90.9 kg (200 lb 8 oz)     Flowsheet Rows         First Filed Value    Admission Height  167.6 cm (66\") Documented at 2018 0000    Admission Weight  90.9 kg (200 lb 8 oz) Documented at 2018 0000             Physical Exam:     General Appearance:    Alert, cooperative, in no acute " distress   Head:    Normocephalic, without obvious abnormality, atraumatic   Eyes:            Lids and lashes normal, conjunctivae and sclerae normal, no   icterus, no pallor, corneas clear, PERRLA   Ears:    Ears appear intact with no abnormalities noted   Throat:   No oral lesions, no thrush, oral mucosa moist   Neck:   No adenopathy, supple, trachea midline, no thyromegaly, no   carotid bruit, no JVD   Back:     No kyphosis present, no scoliosis present, no skin lesions,      erythema or scars, no tenderness to percussion or                   palpation,   range of motion normal   Lungs:     Clear to auscultation,respirations regular, even and                  unlabored    Heart:    Regular rhythm and normal rate, normal S1 and S2, no            murmur, no gallop, no rub, no click   Chest Wall:    No abnormalities observed   Abdomen:     Normal bowel sounds, no masses, no organomegaly, soft        non-tender, non-distended, no guarding, no rebound                tenderness   Rectal:     Deferred   Extremities:   Moves all extremities well, no edema, no cyanosis, no             redness   Pulses:   Pulses palpable and equal bilaterally   Skin:   No bleeding, bruising or rash       Neurologic:   Cranial nerves 2 - 12 grossly intact, sensation intact, DTR       present and equal bilaterally       Telemetry  Normal sinus rhythm    ECG  ECG/EMG Results (last 24 hours)     Procedure Component Value Units Date/Time    ECG 12 Lead [[id number]] Collected:  03/01/18 0032     Updated:  03/01/18 0033    Narrative:       Test Reason : new admit  Blood Pressure : **/** mmHG  Vent. Rate : 086 BPM     Atrial Rate : 086 BPM     P-R Int : 208 ms          QRS Dur : 102 ms      QT Int : 372 ms       P-R-T Axes : 063 041 064 degrees     QTc Int : 445 ms    Normal sinus rhythm  Possible Inferior infarct , age undetermined  Abnormal ECG  No previous ECGs available    Referred By:  STEPHANIE           Confirmed By:     ECG 12 Lead  [[id number]] Collected:  03/01/18 0657     Updated:  03/01/18 0659    Narrative:       Test Reason : chest pain  Blood Pressure : **/** mmHG  Vent. Rate : 067 BPM     Atrial Rate : 067 BPM     P-R Int : 208 ms          QRS Dur : 108 ms      QT Int : 430 ms       P-R-T Axes : 068 048 070 degrees     QTc Int : 454 ms    Normal sinus rhythm  Normal ECG  When compared with ECG of 01-MAR-2018 00:32, (Unconfirmed)  No significant change was found    Referred By:  STEPHANIE           Confirmed By:             Labs    Results from last 7 days  Lab Units 03/01/18  0050   WBC 10*3/mm3 9.33   HEMOGLOBIN g/dL 11.3*   HEMATOCRIT % 35.8*   PLATELETS 10*3/mm3 280       Results from last 7 days  Lab Units 03/01/18  0050   SODIUM mmol/L 138   POTASSIUM mmol/L 3.7   CHLORIDE mmol/L 103   CO2 mmol/L 25.9   BUN mg/dL 26*   CREATININE mg/dL 1.19   CALCIUM mg/dL 9.8   GLUCOSE mg/dL 95       Results from last 7 days  Lab Units 03/01/18  0050   BILIRUBIN mg/dL 0.5   ALK PHOS U/L 63   AST (SGOT) U/L 17   ALT (SGPT) U/L 16                   Results from last 7 days  Lab Units 03/01/18  0839 03/01/18  0729 03/01/18  0050 03/01/18  0026   CK TOTAL U/L  --  73 76  --    TROPONIN I ng/mL 0.118* 0.095*  --  0.064*   CK MB INDEX %  --  1.7  --   --            Imaging  [email or web address]        Assessment     1.  Chest pain.  Both typical and atypical features for angina .  Troponin I level is indeterminant.  Nuclear stress test done 8/2017 showed no evidence of ischemia.  Possibilities include GERD and gallbladder disease.  2.  PVCs-stable on flecainide therapy.  Less symptomatic  3.  Gallbladder disease-polyposis  4.  Hypertension  5.  Hyperlipidemia not take statin due to intolerance  6.  DM type II  7.  GERD    Plan    1.  Obtain serial cardiac markers and ECGs to rule out MI  2.  Resume her home medications as listed above  3.  Echocardiogram to evaluate left ventricular function and wall motion  4.  Upper GI series with esophagogram to rule out  GERD/hiatal hernia as cause of her chest pain.  5.  Resuscitation status-full code  6.  DVT prophylaxis-Lovenox 40 mg subcutaneous daily  7.  Start Protonix 40 mg by mouth daily    I discussed the patients findings and my recommendations with patient    Sheri Lin MD  03/01/18  9:30 AM       Cc: Dr. Jonathan Kelly

## 2018-03-01 NOTE — H&P
Sheri Lin MD Physician Signed Cardiology Progress Notes Date of Service: 3/1/2018  9:29 AM      Expand All Collapse All    []Hide copied text  Primary care provider: Dr. Jonathan Kelly     Admitting physician-Dr. Lin     Chief complaint:     Chest tightness        History of present illness:       62-year-old woman has been admitted with history of chest tightness.     She developed chest pain yesterday morning which is retrosternal,, tightness, moderate in intensity, intermittent, with no definite aggravating or relieving factors and no radiation.  She visited the emergency department at [facility] from that she was transferred here for further care.  Her troponin level was indeterminate-0.06.  ECG showed normal sinus rhythm no evidence of ischemia.  After admission to discuss acidity, she remained chest pain-free.  No history of associated nausea or vomiting.  No history of diaphoresis.     Effort tolerance is good.  No history of PND, orthopnea or leg edema.  She has history of occasional palpitations due to PVCs.  No history of dizziness.     She has history of PVCs for which she takes flecainide.  No history of known coronary artery disease or CHF.  A nuclear stress test done in 8/2017 showed no evidence of ischemia and LVEF was normal.  Echocardiogram done 7/2017 showed normal LVEF and wall motion and no evidence of significant valvular heart disease.     Cardiac risk factors include history of DM type II, hypertension and hyperlipidemia.  History of COPD.  She quit smoking several years ago.  Recently diagnosed with a polyp in the gallbladder and been scheduled for gallbladder surgery next week by Dr. Mccabe [facility].           Review of Systems     Constitutional-fatigue    ENT-none  Cardiovascular-as above  Respiratooy- no symptoms.  History of COPD   GI-reflux disease   Musculoskeletal-gout and backache  Endocrine-diabetes and lipid disorder  Other organ system  involvement-negative         Medical History                Past Medical History:   Diagnosis Date   • Arthritis     • Asthma     • Diabetic peripheral neuropathy     • Esophageal reflux     • Gout     • Hyperlipidemia     • Hypertension                       , Past Surgical History:   Procedure Laterality Date   •  SECTION      • HYSTERECTOMY              , Family History   Problem Relation Age of Onset   • Diabetes Other     • Hypertension Other          , Social History   Substance Use Topics   • Smoking status: Former Smoker   • Smokeless tobacco: Not on file   • Alcohol use No          Medication Review:     allopurinol (ZYLOPRIM) tablet 100 mg 100 mg, PO, Nightly Given: 133           aspirin chewable tablet 81 mg 81 mg, PO, Daily Ordered         CloNIDine (CATAPRES) tablet 0.1 mg 0.1 mg, PO, BID Ordered         enoxaparin (LOVENOX) syringe 40 mg 40 mg, SC, Q24H Given: 132         flecainide (TAMBOCOR) tablet 100 mg 100 mg, PO, BID Given: 132         furosemide (LASIX) tablet 20 mg 20 mg, PO, Daily Ordered         losartan (COZAAR) tablet 100 mg 100 mg, PO, Daily Ordered         metFORMIN (GLUCOPHAGE) tablet 500 mg 500 mg, PO, Daily With Breakfast Ordered         metoprolol tartrate (LOPRESSOR) tablet 25 mg 25 mg, PO, Q12H Given: 132         pneumococcal polysaccharide 23-valent (PNEUMOVAX-23) vaccine 0.5 mL 0.5 mL, IM, Once Ordered                      Allergies:  Meperidine; Morphine; and Adhesive tape      Past surgical history-hysterectomy and knee surgery      Social history-former smoker.  No history of fall: Or substance abuse      Family history-mother has history of heart disease            Objective         Vital Sign Min/Max for last 24 hours  Temp  Min: 98.1 °F (36.7 °C)  Max: 98.8 °F (37.1 °C)   BP  Min: 117/71  Max: 160/93   Pulse  Min: 65  Max: 91   Resp  Min: 18  Max: 20   SpO2  Min: 94 %  Max: 98 %   No Data Recorded   Weight  Min: 90.9 kg (200 lb 8 oz)   "Max: 90.9 kg (200 lb 8 oz)      Flowsheet Rows           First Filed Value     Admission Height   167.6 cm (66\") Documented at 03/01/2018 0000     Admission Weight   90.9 kg (200 lb 8 oz) Documented at 03/01/2018 0000            Physical Exam:      General Appearance:    Alert, cooperative, in no acute distress   Head:    Normocephalic, without obvious abnormality, atraumatic   Eyes:            Lids and lashes normal, conjunctivae and sclerae normal, no   icterus, no pallor, corneas clear, PERRLA   Ears:    Ears appear intact with no abnormalities noted   Throat:   No oral lesions, no thrush, oral mucosa moist   Neck:   No adenopathy, supple, trachea midline, no thyromegaly, no   carotid bruit, no JVD   Back:     No kyphosis present, no scoliosis present, no skin lesions,      erythema or scars, no tenderness to percussion or                   palpation,   range of motion normal   Lungs:     Clear to auscultation,respirations regular, even and                  unlabored    Heart:    Regular rhythm and normal rate, normal S1 and S2, no            murmur, no gallop, no rub, no click   Chest Wall:    No abnormalities observed   Abdomen:     Normal bowel sounds, no masses, no organomegaly, soft        non-tender, non-distended, no guarding, no rebound                tenderness   Rectal:     Deferred   Extremities:   Moves all extremities well, no edema, no cyanosis, no             redness   Pulses:   Pulses palpable and equal bilaterally   Skin:   No bleeding, bruising or rash         Neurologic:   Cranial nerves 2 - 12 grossly intact, sensation intact, DTR       present and equal bilaterally         Telemetry  Normal sinus rhythm     ECG  ECG/EMG Results (last 24 hours)     Procedure Component Value Units Date/Time     ECG 12 Lead [[id number]] Collected:  03/01/18 0032       Updated:  03/01/18 0033     Narrative:        Test Reason : new admit  Blood Pressure : **/** mmHG  Vent. Rate : 086 BPM     Atrial Rate : 086 " BPM     P-R Int : 208 ms          QRS Dur : 102 ms      QT Int : 372 ms       P-R-T Axes : 063 041 064 degrees     QTc Int : 445 ms     Normal sinus rhythm  Possible Inferior infarct , age undetermined  Abnormal ECG  No previous ECGs available     Referred By:  STEPHANIE           Confirmed By:      ECG 12 Lead [[id number]] Collected:  03/01/18 0657       Updated:  03/01/18 0659     Narrative:        Test Reason : chest pain  Blood Pressure : **/** mmHG  Vent. Rate : 067 BPM     Atrial Rate : 067 BPM     P-R Int : 208 ms          QRS Dur : 108 ms      QT Int : 430 ms       P-R-T Axes : 068 048 070 degrees     QTc Int : 454 ms     Normal sinus rhythm  Normal ECG  When compared with ECG of 01-MAR-2018 00:32, (Unconfirmed)  No significant change was found     Referred By:  STEPHANIE           Confirmed By:                Labs     Results from last 7 days  Lab Units 03/01/18  0050   WBC 10*3/mm3 9.33   HEMOGLOBIN g/dL 11.3*   HEMATOCRIT % 35.8*   PLATELETS 10*3/mm3 280         Results from last 7 days  Lab Units 03/01/18  0050   SODIUM mmol/L 138   POTASSIUM mmol/L 3.7   CHLORIDE mmol/L 103   CO2 mmol/L 25.9   BUN mg/dL 26*   CREATININE mg/dL 1.19   CALCIUM mg/dL 9.8   GLUCOSE mg/dL 95         Results from last 7 days  Lab Units 03/01/18  0050   BILIRUBIN mg/dL 0.5   ALK PHOS U/L 63   AST (SGOT) U/L 17   ALT (SGPT) U/L 16                     Results from last 7 days  Lab Units 03/01/18  0839 03/01/18  0729 03/01/18  0050 03/01/18  0026   CK TOTAL U/L  --  73 76  --    TROPONIN I ng/mL 0.118* 0.095*  --  0.064*   CK MB INDEX %  --  1.7  --   --              Imaging  @[id number]           Assessment      1.  Chest pain.  Both typical and atypical features for angina .  Troponin I level is indeterminant.  Nuclear stress test done 8/2017 showed no evidence of ischemia.  Possibilities include GERD and gallbladder disease.  2.  PVCs-stable on flecainide therapy.  Less symptomatic  3.  Gallbladder disease-polyposis  4.   Hypertension  5.  Hyperlipidemia not take statin due to intolerance  6.  DM type II  7.  GERD     Plan     1.  Obtain serial cardiac markers and ECGs to rule out MI  2.  Resume her home medications as listed above  3.  Echocardiogram to evaluate left ventricular function and wall motion  4.  Upper GI series with esophagogram to rule out GERD/hiatal hernia as cause of her chest pain.  5.  Resuscitation status-full code  6.  DVT prophylaxis-Lovenox 40 mg subcutaneous daily  7.  Start Protonix 40 mg by mouth daily     I discussed the patients findings and my recommendations with patient     Sheri Lin MD  03/01/18  9:30 AM         Cc: Dr. Jonathan Kelly

## 2018-03-02 PROBLEM — I25.9 CHEST PAIN DUE TO MYOCARDIAL ISCHEMIA: Status: ACTIVE | Noted: 2018-02-28

## 2018-03-02 LAB
CRP SERPL HS-MCNC: 2.53 MG/L (ref 0–3)
GLUCOSE BLDC GLUCOMTR-MCNC: 105 MG/DL (ref 70–130)
GLUCOSE BLDC GLUCOMTR-MCNC: 116 MG/DL (ref 70–130)
TROPONIN I SERPL-MCNC: 0.06 NG/ML

## 2018-03-02 PROCEDURE — 25010000002 EPTIFIBATIDE PER 5 MG

## 2018-03-02 PROCEDURE — 93454 CORONARY ARTERY ANGIO S&I: CPT | Performed by: INTERNAL MEDICINE

## 2018-03-02 PROCEDURE — G0378 HOSPITAL OBSERVATION PER HR: HCPCS

## 2018-03-02 PROCEDURE — 92928 PRQ TCAT PLMT NTRAC ST 1 LES: CPT | Performed by: INTERNAL MEDICINE

## 2018-03-02 PROCEDURE — C1725 CATH, TRANSLUMIN NON-LASER: HCPCS | Performed by: INTERNAL MEDICINE

## 2018-03-02 PROCEDURE — 25010000002 DOPAMINE PER 40 MG

## 2018-03-02 PROCEDURE — 94799 UNLISTED PULMONARY SVC/PX: CPT

## 2018-03-02 PROCEDURE — 0 IOPAMIDOL PER 1 ML: Performed by: INTERNAL MEDICINE

## 2018-03-02 PROCEDURE — 25010000002 FENTANYL CITRATE (PF) 100 MCG/2ML SOLUTION: Performed by: INTERNAL MEDICINE

## 2018-03-02 PROCEDURE — 92978 ENDOLUMINL IVUS OCT C 1ST: CPT | Performed by: INTERNAL MEDICINE

## 2018-03-02 PROCEDURE — C1887 CATHETER, GUIDING: HCPCS | Performed by: INTERNAL MEDICINE

## 2018-03-02 PROCEDURE — 25010000002 ENOXAPARIN PER 10 MG: Performed by: INTERNAL MEDICINE

## 2018-03-02 PROCEDURE — 25010000002 ONDANSETRON PER 1 MG

## 2018-03-02 PROCEDURE — 02703DZ DILATION OF CORONARY ARTERY, ONE ARTERY WITH INTRALUMINAL DEVICE, PERCUTANEOUS APPROACH: ICD-10-PCS | Performed by: INTERNAL MEDICINE

## 2018-03-02 PROCEDURE — 25010000002 MIDAZOLAM PER 1 MG: Performed by: INTERNAL MEDICINE

## 2018-03-02 PROCEDURE — 84484 ASSAY OF TROPONIN QUANT: CPT | Performed by: INTERNAL MEDICINE

## 2018-03-02 PROCEDURE — 82962 GLUCOSE BLOOD TEST: CPT

## 2018-03-02 PROCEDURE — C1769 GUIDE WIRE: HCPCS | Performed by: INTERNAL MEDICINE

## 2018-03-02 PROCEDURE — C1894 INTRO/SHEATH, NON-LASER: HCPCS | Performed by: INTERNAL MEDICINE

## 2018-03-02 PROCEDURE — S0260 H&P FOR SURGERY: HCPCS | Performed by: INTERNAL MEDICINE

## 2018-03-02 PROCEDURE — B2111ZZ FLUOROSCOPY OF MULTIPLE CORONARY ARTERIES USING LOW OSMOLAR CONTRAST: ICD-10-PCS | Performed by: INTERNAL MEDICINE

## 2018-03-02 PROCEDURE — 4A023N7 MEASUREMENT OF CARDIAC SAMPLING AND PRESSURE, LEFT HEART, PERCUTANEOUS APPROACH: ICD-10-PCS | Performed by: INTERNAL MEDICINE

## 2018-03-02 PROCEDURE — 93005 ELECTROCARDIOGRAM TRACING: CPT | Performed by: INTERNAL MEDICINE

## 2018-03-02 PROCEDURE — C1876 STENT, NON-COA/NON-COV W/DEL: HCPCS | Performed by: INTERNAL MEDICINE

## 2018-03-02 PROCEDURE — C1753 CATH, INTRAVAS ULTRASOUND: HCPCS | Performed by: INTERNAL MEDICINE

## 2018-03-02 DEVICE — MULTI-LINK MINI VISION CORONARY STENT AND STENT DELIVERY SYSTEM 2.50 MM X 12 MM / RAPID-EXCHANGE
Type: IMPLANTABLE DEVICE | Status: FUNCTIONAL
Brand: MULTI-LINK MINI VISION

## 2018-03-02 RX ORDER — CLONIDINE HYDROCHLORIDE 0.1 MG/1
0.1 TABLET ORAL EVERY 12 HOURS SCHEDULED
Status: DISCONTINUED | OUTPATIENT
Start: 2018-03-02 | End: 2018-03-04 | Stop reason: HOSPADM

## 2018-03-02 RX ORDER — SODIUM CHLORIDE 9 MG/ML
100 INJECTION, SOLUTION INTRAVENOUS CONTINUOUS
Status: DISCONTINUED | OUTPATIENT
Start: 2018-03-02 | End: 2018-03-02

## 2018-03-02 RX ORDER — DOPAMINE HYDROCHLORIDE 160 MG/100ML
INJECTION, SOLUTION INTRAVENOUS
Status: COMPLETED
Start: 2018-03-02 | End: 2018-03-02

## 2018-03-02 RX ORDER — ONDANSETRON 2 MG/ML
INJECTION INTRAMUSCULAR; INTRAVENOUS
Status: COMPLETED
Start: 2018-03-02 | End: 2018-03-02

## 2018-03-02 RX ORDER — DOPAMINE HYDROCHLORIDE 160 MG/100ML
2 INJECTION, SOLUTION INTRAVENOUS CONTINUOUS
Status: DISCONTINUED | OUTPATIENT
Start: 2018-03-02 | End: 2018-03-02

## 2018-03-02 RX ORDER — FENTANYL CITRATE 50 UG/ML
INJECTION, SOLUTION INTRAMUSCULAR; INTRAVENOUS AS NEEDED
Status: DISCONTINUED | OUTPATIENT
Start: 2018-03-02 | End: 2018-03-02 | Stop reason: HOSPADM

## 2018-03-02 RX ORDER — LIDOCAINE HYDROCHLORIDE 20 MG/ML
INJECTION, SOLUTION INFILTRATION; PERINEURAL AS NEEDED
Status: DISCONTINUED | OUTPATIENT
Start: 2018-03-02 | End: 2018-03-02 | Stop reason: HOSPADM

## 2018-03-02 RX ORDER — MIDAZOLAM HYDROCHLORIDE 1 MG/ML
INJECTION INTRAMUSCULAR; INTRAVENOUS AS NEEDED
Status: DISCONTINUED | OUTPATIENT
Start: 2018-03-02 | End: 2018-03-02 | Stop reason: HOSPADM

## 2018-03-02 RX ORDER — SODIUM CHLORIDE 9 MG/ML
INJECTION, SOLUTION INTRAVENOUS
Status: COMPLETED
Start: 2018-03-02 | End: 2018-03-02

## 2018-03-02 RX ORDER — ATORVASTATIN CALCIUM 10 MG/1
10 TABLET, FILM COATED ORAL NIGHTLY
Status: DISCONTINUED | OUTPATIENT
Start: 2018-03-02 | End: 2018-03-04 | Stop reason: HOSPADM

## 2018-03-02 RX ORDER — CLOPIDOGREL BISULFATE 75 MG/1
600 TABLET ORAL ONCE
Status: COMPLETED | OUTPATIENT
Start: 2018-03-02 | End: 2018-03-02

## 2018-03-02 RX ORDER — SODIUM CHLORIDE 9 MG/ML
INJECTION, SOLUTION INTRAVENOUS CONTINUOUS PRN
Status: DISCONTINUED | OUTPATIENT
Start: 2018-03-02 | End: 2018-03-02 | Stop reason: HOSPADM

## 2018-03-02 RX ORDER — CLOPIDOGREL BISULFATE 75 MG/1
75 TABLET ORAL DAILY
Status: DISCONTINUED | OUTPATIENT
Start: 2018-03-03 | End: 2018-03-04 | Stop reason: HOSPADM

## 2018-03-02 RX ORDER — SODIUM CHLORIDE 9 MG/ML
75 INJECTION, SOLUTION INTRAVENOUS CONTINUOUS
Status: DISCONTINUED | OUTPATIENT
Start: 2018-03-02 | End: 2018-03-02

## 2018-03-02 RX ORDER — ONDANSETRON 2 MG/ML
4 INJECTION INTRAMUSCULAR; INTRAVENOUS EVERY 6 HOURS PRN
Status: DISCONTINUED | OUTPATIENT
Start: 2018-03-02 | End: 2018-03-04 | Stop reason: HOSPADM

## 2018-03-02 RX ADMIN — SODIUM CHLORIDE 75 ML/HR: 9 INJECTION, SOLUTION INTRAVENOUS at 10:16

## 2018-03-02 RX ADMIN — ASPIRIN 81 MG: 81 TABLET, CHEWABLE ORAL at 08:35

## 2018-03-02 RX ADMIN — DOPAMINE HYDROCHLORIDE 2 MCG/KG/MIN: 160 INJECTION, SOLUTION INTRAVENOUS at 04:14

## 2018-03-02 RX ADMIN — ALLOPURINOL 100 MG: 100 TABLET ORAL at 20:41

## 2018-03-02 RX ADMIN — CLOPIDOGREL 525 MG: 75 TABLET, FILM COATED ORAL at 10:19

## 2018-03-02 RX ADMIN — ENOXAPARIN SODIUM 90 MG: 100 INJECTION SUBCUTANEOUS at 08:35

## 2018-03-02 RX ADMIN — ONDANSETRON 4 MG: 2 INJECTION, SOLUTION INTRAMUSCULAR; INTRAVENOUS at 04:45

## 2018-03-02 RX ADMIN — FLECAINIDE ACETATE 100 MG: 50 TABLET ORAL at 08:32

## 2018-03-02 RX ADMIN — ENOXAPARIN SODIUM 30 MG: 30 INJECTION SUBCUTANEOUS at 14:00

## 2018-03-02 RX ADMIN — ONDANSETRON 4 MG: 2 INJECTION INTRAMUSCULAR; INTRAVENOUS at 04:45

## 2018-03-02 RX ADMIN — SODIUM CHLORIDE 250 ML/HR: 9 INJECTION, SOLUTION INTRAVENOUS at 02:10

## 2018-03-02 RX ADMIN — CLONIDINE HYDROCHLORIDE 0.1 MG: 0.1 TABLET ORAL at 20:40

## 2018-03-02 RX ADMIN — SODIUM CHLORIDE 500 ML: 9 INJECTION, SOLUTION INTRAVENOUS at 04:14

## 2018-03-02 RX ADMIN — CLOPIDOGREL 75 MG: 75 TABLET, FILM COATED ORAL at 08:35

## 2018-03-02 RX ADMIN — ATORVASTATIN CALCIUM 10 MG: 10 TABLET, FILM COATED ORAL at 22:32

## 2018-03-02 NOTE — PROGRESS NOTES
Discharge Planning Assessment  [facility]     Patient Name: Annette Suarez  MRN: [id number]  Today's Date: 3/2/2018    Admit Date: 2/28/2018          Discharge Needs Assessment     None            Discharge Plan       03/02/18 1219    Case Management/Social Work Plan    Plan Pt admitted on 2/28/18.  Pt lives at home with handicap son and plans to return home at discharge.  Pt currently does not utilize home health services or DME.  SS will follow and assist with discharge needs.     Patient/Family In Agreement With Plan yes        Discharge Placement     No information found                Demographic Summary     None            Functional Status     None            Psychosocial     None            Abuse/Neglect     None            Legal     None            Substance Abuse     None            Patient Forms     None          Barbi Leyva

## 2018-03-02 NOTE — PLAN OF CARE
Problem: Patient Care Overview (Adult)  Goal: Plan of Care Review  Outcome: Ongoing (interventions implemented as appropriate)    Goal: Adult Individualization and Mutuality  Outcome: Ongoing (interventions implemented as appropriate)    Goal: Discharge Needs Assessment  Outcome: Ongoing (interventions implemented as appropriate)      Problem: Fall Risk (Adult)  Goal: Identify Related Risk Factors and Signs and Symptoms  Outcome: Ongoing (interventions implemented as appropriate)    Goal: Absence of Falls  Outcome: Ongoing (interventions implemented as appropriate)      Problem: Skin Integrity Impairment, Risk/Actual (Adult)  Goal: Identify Related Risk Factors and Signs and Symptoms  Outcome: Ongoing (interventions implemented as appropriate)    Goal: Skin Integrity/Wound Healing  Outcome: Ongoing (interventions implemented as appropriate)      Problem: Pain, Acute (Adult)  Goal: Identify Related Risk Factors and Signs and Symptoms  Outcome: Outcome(s) achieved Date Met: 03/02/18    Goal: Acceptable Pain Control/Comfort Level  Outcome: Ongoing (interventions implemented as appropriate)      Problem: Diabetes, Type 2 (Adult)  Goal: Signs and Symptoms of Listed Potential Problems Will be Absent or Manageable (Diabetes, Type 2)  Outcome: Ongoing (interventions implemented as appropriate)

## 2018-03-02 NOTE — CONSULTS
Patient Name: Annette Suarez  Age/Sex: 62 y.o. female  : 1955  MRN: [id number]    Date of Hospital Visit: 2018  Encounter Provider: Ryland Marina MD  Referring Provider: Sheri Lin MD         Subjective:       Chief Complaint: chest pain    History of Present Illness:  Annette Suarez is a 62 y.o. female without known CAD with CP and trace troponin elevations. Dr. Lin requests a cardiac catheterization. Pt requests a BMS be implanted if needed as she needs CCX in the next several weeks and does not want to be committed to [facility] for 1 yr. Pt recvd lovenox 90mg this AM but refused her metformin which was last taken yesterday. Pt had some breakfast this AM. Pt with non-diagnostic EKG but with apical WMA on echo.    After discussing risks, benefits and alternatives with the patient,written informed consent was obtained. Pt wants to discuss procedure with her son and prefers to wait to arrange the procedure until after she sees him.         Past Medical History:  Past Medical History:   Diagnosis Date   • Arthritis    • Asthma    • Diabetic peripheral neuropathy    • Esophageal reflux    • Gout    • Hyperlipidemia    • Hypertension        Past Surgical History:   Procedure Laterality Date   •  SECTION     • HYSTERECTOMY         Home Medications:    Prescriptions Prior to Admission   Medication Sig Dispense Refill Last Dose   • allopurinol (ZYLOPRIM) 100 MG tablet Take 100 mg by mouth Every Night.   2018 at 2100   • aspirin 81 MG tablet Take 81 mg by mouth Daily.   2018 at 1000   • cloNIDine (CATAPRES) 0.1 MG tablet Take 0.1 mg by mouth 2 (Two) Times a Day.  0 2018 at 1600   • flecainide (TAMBOCOR) 100 MG tablet Take 100 mg by mouth 2 (Two) Times a Day.   2018 at 1000   • furosemide (LASIX) 20 MG tablet take 1 tablet by mouth once daily  0 2018 at 1000   • losartan (COZAAR) 100 MG tablet Take 100 mg by mouth Daily.   2018 at 1000   • metFORMIN  (GLUCOPHAGE) 500 MG tablet Take 500 mg by mouth Daily.   2/28/2018 at 1000   • naproxen (NAPROSYN) 500 MG tablet Take 500 mg by mouth 2 (Two) Times a Day As Needed for Mild Pain .   Past Week at Unknown time   • tiZANidine (ZANAFLEX) 4 MG tablet Take 4 mg by mouth Every 8 (Eight) Hours As Needed.  0 2/28/2018 at 1000   • melatonin 5 MG tablet tablet Take 5 mg by mouth Every Night.   2/27/2018 at 2100       Allergies:  Allergies   Allergen Reactions   • Meperidine Delirium and Hallucinations   • Morphine Delirium, Irritability and Hallucinations   • Adhesive Tape Itching, Dermatitis and Rash       Past Social History:  Social History     Social History   • Marital status: Single     Social History Main Topics   • Smoking status: Former Smoker   • Alcohol use No   • Drug use: No   • Sexual activity: Defer       Past Family History:  Family History   Problem Relation Age of Onset   • Diabetes Other    • Hypertension Other        Review of Systems:   Constitution: No chills, no rigors, no unexplained weight loss or weight gain  Eyes:  No diplopia, no blurred vision, no loss of vision, conjunctiva is pink and sclera is anicteric  ENT:  No tinnitus, no otorrhea, no epistaxis, no sore throat   Respiratory: No cough, no hemoptysis  Cardiovascular: see HPI  Gastrointestinal: No nausea, no vomiting, no hematemesis, no diarrhea or constipation, no melena  Genitourinary: No frequency of dysuria no hematuria  Integument: No pruritis and  no skin rash  Hematologic / Lymphatic: No excessive bleeding, easy bruising, fatigue, lymphadenopathy and petechiae  Musculoskeletal: No joint pain, joint stiffness, joint swelling, muscle pain, muscle weakness and neck pain  Neurological: No dizziness, headaches, light headedness, seizures and vertigo  Endocrine: No frequent urination and nocturia, temperature intolerance, weight gain, unintended and weight loss, unintended      Objective:     Objective:  Vital Signs (last 24 hours)       03/01  0700  -  03/02 0659 03/02 0700  -  03/02 0913   Most Recent    Temp (°F) 97.3 -  99.2       97.3 (36.3)    Heart Rate 54 -  74      68     68    Resp 18 -  20      19     19    BP (!)82/40 -  144/67      130/82     130/82    SpO2 (%) 93 -  98      93     93  Comment: sleeping          Body mass index is 32.36 kg/(m^2).  Weight change:           PHYSICAL EXAM:    General: Alert and oriented, no acute distress  Head: Normocephalic, without obvious abnormality, atraumatic  Neck: Supple, trachea midline, no JVD  Lungs: Clear to auscultation,respirations regular, even and unlabored  Heart: Regular rate and rhythm, normal S1 and S2, no rub, murmur, or gallop,    Chest wall: No abnormalities observed  Abdomen:Normal bowel sounds,  non-distended  Extremities:Moves all extremities well, no edema, no cyanosis, no redness  Pulses: Pulses palpable and equal bilaterally  Skin: No bleeding, bruising or rash  Neurologic: A & O x 3     Access:nml rue Samy's test.    Lab Review:       Results from last 7 days  Lab Units 03/01/18  0050   SODIUM mmol/L 138   POTASSIUM mmol/L 3.7   CHLORIDE mmol/L 103   CO2 mmol/L 25.9   BUN mg/dL 26*   CREATININE mg/dL 1.19   CALCIUM mg/dL 9.8   BILIRUBIN mg/dL 0.5   ALK PHOS U/L 63   ALT (SGPT) U/L 16   AST (SGOT) U/L 17   GLUCOSE mg/dL 95       Results from last 7 days  Lab Units 03/02/18  0211 03/01/18 2015 03/01/18  1536  03/01/18  0729 03/01/18  0050   CK TOTAL U/L  --   --   --   --  73 76   TROPONIN I ng/mL 0.063* 0.070* 0.085*  < > 0.095*  --    CK MB INDEX %  --   --   --   --  1.7  --    < > = values in this interval not displayed.    Results from last 7 days  Lab Units 03/01/18  0839   BNP pg/mL 707.0*       Results from last 7 days  Lab Units 03/01/18  0050   WBC 10*3/mm3 9.33   HEMOGLOBIN g/dL 11.3*   HEMATOCRIT % 35.8*   PLATELETS 10*3/mm3 280               Results from last 7 days  Lab Units 03/01/18  0050   CHOLESTEROL mg/dL 238*   TRIGLYCERIDES mg/dL 255*   HDL CHOL mg/dL 42*   LDL  CHOL mg/dL 145*           Invalid input(s):  T4,  FREET4    EKG:   ECG/EMG Results (last 24 hours)     Procedure Component Value Units Date/Time    Adult Transthoracic Echo Complete W/ Cont if Necessary Per Protocol [[id number]] Collected:  03/01/18 0916     Updated:  03/01/18 1106     BSA 2.0 m^2      IVSd 1.5 cm      IVSs 1.9 cm      LVIDd 6.4 cm      LVIDs 4.3 cm      LVPWd 1.7 cm      BH CV ECHO RAMONITA - LVPWS 2.6 cm      IVS/LVPW 0.86     FS 32.9 %      EDV(Teich) 206.5 ml      ESV(Teich) 81.9 ml      EF(Teich) 60.3 %      EDV(cubed) 258.8 ml      ESV(cubed) 78.1 ml      EF(cubed) 69.8 %      % IVS thick 27.0 %      % LVPW thick 48.8 %      LV mass(C)d 521.2 grams      LV mass(C)dI 262.2 grams/m^2      LV mass(C)s 493.3 grams      LV mass(C)sI 248.2 grams/m^2      SV(Teich) 124.5 ml      SI(Teich) 62.7 ml/m^2      SV(cubed) 180.6 ml      SI(cubed) 90.9 ml/m^2      Ao root diam 3.8 cm      Ao root area 11.5 cm^2      ACS 2.1 cm      LA dimension 3.7 cm      LA/Ao 0.96     LVOT diam 2.0 cm      LVOT area 3.3 cm^2      LVOT area(traced) 3.1 cm^2      Ao root area (BSA corrected) 1.9     MV E max nani 64.2 cm/sec      MV A max nani 104.6 cm/sec      MV E/A 0.61     MV P1/2t max nani 64.7 cm/sec      MV P1/2t 60.3 msec      MVA(P1/2t) 3.6 cm^2      MV dec slope 314.2 cm/sec^2      Ao pk nani 137.1 cm/sec      Ao max PG 7.5 mmHg      Ao max PG (full) 3.3 mmHg      Ao V2 mean 90.0 cm/sec      Ao mean PG 3.9 mmHg      Ao mean PG (full) 1.6 mmHg      Ao V2 VTI 26.3 cm      HORACE(I,A) 2.5 cm^2      HORACE(I,D) 2.5 cm^2      HORACE(V,A) 2.5 cm^2      HORACE(V,D) 2.5 cm^2      AI max nani 336.6 cm/sec      AI max PG 45.3 mmHg      AI dec slope 148.3 cm/sec^2      AI P1/2t 665.0 msec      LV V1 max PG 4.3 mmHg      LV V1 mean PG 2.3 mmHg      LV V1 max 103.2 cm/sec      LV V1 mean 67.5 cm/sec      LV V1 VTI 20.0 cm      SV(Ao) 303.3 ml      SI(Ao) 152.6 ml/m^2      SV(LVOT) 65.3 ml      SI(LVOT) 32.8 ml/m^2      PA acc slope 1077 cm/sec^2       PA acc time 0.1 sec      TR max nani 262.0 cm/sec      RVSP(TR) 37.5 mmHg      RAP systole 10.0 mmHg      PA pr(Accel) 33.1 mmHg      MVA P1/2T LCG 3.4 cm^2       CV ECHO RAMONITA - BZI_BMI 31.8 kilograms/m^2       CV ECHO ARMONITA - BSA(HAYCOCK) 2.1 m^2       CV ECHO RAMONITA - BZI_METRIC_WEIGHT 89.4 kg       CV ECHO RAMONITA - BZI_METRIC_HEIGHT 167.6 cm      Target HR (85%) 136 bpm      Max. Pred. HR (100%) 160 bpm     Narrative:       · Left ventricular systolic function is normal. Estimated EF appears to be   in the range of 56 - 60%  · Moderate hypokinesis of apical segments is noted  · Left ventricular diastolic dysfunction (grade I) consistent with   impaired relaxation.  · Mild aortic valve regurgitation is present.  · Mild mitral valve regurgitation is present  · Mild tricuspid valve regurgitation is present.  · Mild pulmonary hypertension is present.  · There is trace-to-mild pulmonic valve regurgitation present  · adjacent to the right ventricle.  · There is a trivial pericardial effusion adjacent to the right ventricle       ECG 12 Lead [[id number]] Collected:  03/02/18 0158     Updated:  03/02/18 0159    Narrative:       Test Reason : change in status  Blood Pressure : **/** mmHG  Vent. Rate : 051 BPM     Atrial Rate : 051 BPM     P-R Int : 210 ms          QRS Dur : 094 ms      QT Int : 492 ms       P-R-T Axes : 068 070 075 degrees     QTc Int : 453 ms    Sinus bradycardia with 1st degree AV block  Otherwise normal ECG  When compared with ECG of 01-MAR-2018 06:57, (Unconfirmed)  Non-specific change in ST segment in Anterior leads  Inverted T waves have replaced nonspecific T wave abnormality in Anterior  leads    Referred By:  HOLLY           Confirmed By:           Imaging:  Imaging Results (last 24 hours)     Procedure Component Value Units Date/Time    FL Upper GI Single Contrast SBFT [[id number]] Collected:  03/01/18 1245     Updated:  03/01/18 1443    Addenda:        Addendum: Upper GI was performed that  demonstrated marked  gastroesophageal reflux and distal esophageal tertiary contractions  denoting dysmotility. Early portions of small bowel show no significant  abnormality.     This report was finalized on 3/1/2018 12:53 PM by Dr. Kenroy Pizano MD.     Signed:  03/01/18 1253 by Kenroy Pizano MD    Narrative:       EXAMINATION: FL UPPER GI SINGLE CONTRAST SBFT-      Technique: Frontal view of the abdomen.     CLINICAL INDICATION:     Artery of chest pain      COMPARISON:    None available.     FINDINGS:       Bowel gas pattern is nonspecific and nonobstructive in  appearance. Contrast is noted within the right colon.No significant bony  abnormality identified. No definite radiographic evidence of soft tissue  mass.            Impression:       Contrast material noted within the right colon.     This report was finalized on 3/1/2018 12:46 PM by Dr. Kenroy Pizano MD.             I personally viewed and interpreted the patient's EKG/Telemetry data.    Assessment:     Active Problems:    Chest pain          Plan:       ACS: suspected based on risk factors, trace troponin elevation, symptoms and new abnormal apical WMA on echo. Agree with proceeding to cardiac catheterization. Offered noon procedure however patient wants to wait until can visit with son therefore is not being scheduled as of yet.    Please hold metformin and lovenox. Maintain NPO. If patient agrees to a procedure today after seeing son will plan a RTR access. Will avoid femoral access as pt given lovenox therapeutic dose this AM.      Ryland Marina MD  03/02/18  9:13 AM

## 2018-03-02 NOTE — NURSING NOTE
At this time pt called out on call bell stating she felt bad. Upon entering room pt is clammy. Vs are as follows: Temp 97.3 axillary, B/P 74/48, HR 58, resp 20, 02 97 room air. Glucose reads 105. Stat EKG and stat troponin's taken. Dr. Puga called at this time and 250 ns bolus order and recheck b/p in 1 hr. Will cont to monitor pt.

## 2018-03-02 NOTE — PLAN OF CARE
Problem: Pain, Acute (Adult)  Goal: Identify Related Risk Factors and Signs and Symptoms  Outcome: Ongoing (interventions implemented as appropriate)    Goal: Acceptable Pain Control/Comfort Level  Outcome: Ongoing (interventions implemented as appropriate)      Problem: Diabetes, Type 2 (Adult)  Goal: Signs and Symptoms of Listed Potential Problems Will be Absent or Manageable (Diabetes, Type 2)  Outcome: Ongoing (interventions implemented as appropriate)

## 2018-03-02 NOTE — CONSULTS
"Diabetes Education  Assessment/Teaching    Patient Name:  Annette Suarez  YOB: 1955  MRN: [id number]  Admit Date:  2/28/2018      Assessment Date:  3/2/2018       Most Recent Value    General Information      Height  167.6 cm (66\")    Height Method  Stated    Weight  90.9 kg (200 lb 8 oz)    Weight Method  Bed scale    Pregnancy Assessment     Diabetes History     What type of diabetes do you have?  Type 2    Length of Diabetes Diagnosis  6 - 10 years    Current DM knowledge  fair    What makes it difficult for you to take care of your diabetes or yourself?  \"lost 50 lbs A1c is 5.6 now\"    Education Preferences     Nutrition Information     Assessment Topics     DM Goals                Most Recent Value    DM Education Needs     Meter  Has own    Healthy Coping  Appropriate [talkative but appropriate]    Discharge Plan  Home    Motivation  Moderate    Teaching Method  Explanation, Discussion, Handouts, Teach back    Patient Response  Verbalized understanding [son at bedside]            Other Comments:          Electronically signed by:  Bella Snow RN  03/02/18 3:19 PM  "

## 2018-03-03 LAB
ALBUMIN SERPL-MCNC: 4.1 G/DL (ref 3.4–4.8)
ALBUMIN/GLOB SERPL: 1.5 G/DL (ref 1.5–2.5)
ALP SERPL-CCNC: 64 U/L (ref 35–104)
ALT SERPL W P-5'-P-CCNC: 11 U/L (ref 10–36)
ANION GAP SERPL CALCULATED.3IONS-SCNC: 6.9 MMOL/L (ref 3.6–11.2)
AST SERPL-CCNC: 16 U/L (ref 10–30)
BILIRUB SERPL-MCNC: 0.3 MG/DL (ref 0.2–1.8)
BUN BLD-MCNC: 25 MG/DL (ref 7–21)
BUN/CREAT SERPL: 14.6 (ref 7–25)
CALCIUM SPEC-SCNC: 9.4 MG/DL (ref 7.7–10)
CHLORIDE SERPL-SCNC: 108 MMOL/L (ref 99–112)
CO2 SERPL-SCNC: 24.1 MMOL/L (ref 24.3–31.9)
CREAT BLD-MCNC: 1.71 MG/DL (ref 0.43–1.29)
DEPRECATED RDW RBC AUTO: 43.8 FL (ref 37–54)
ERYTHROCYTE [DISTWIDTH] IN BLOOD BY AUTOMATED COUNT: 13.2 % (ref 11.5–14.5)
GFR SERPL CREATININE-BSD FRML MDRD: 30 ML/MIN/1.73
GLOBULIN UR ELPH-MCNC: 2.8 GM/DL
GLUCOSE BLD-MCNC: 85 MG/DL (ref 70–110)
GLUCOSE BLDC GLUCOMTR-MCNC: 102 MG/DL (ref 70–130)
GLUCOSE BLDC GLUCOMTR-MCNC: 91 MG/DL (ref 70–130)
GLUCOSE BLDC GLUCOMTR-MCNC: 94 MG/DL (ref 70–130)
GLUCOSE BLDC GLUCOMTR-MCNC: 96 MG/DL (ref 70–130)
HCT VFR BLD AUTO: 33.5 % (ref 37–47)
HGB BLD-MCNC: 10.7 G/DL (ref 12–16)
MCH RBC QN AUTO: 29.2 PG (ref 27–33)
MCHC RBC AUTO-ENTMCNC: 31.9 G/DL (ref 33–37)
MCV RBC AUTO: 91.3 FL (ref 80–94)
OSMOLALITY SERPL CALC.SUM OF ELEC: 281.2 MOSM/KG (ref 273–305)
PLATELET # BLD AUTO: 260 10*3/MM3 (ref 130–400)
PMV BLD AUTO: 10.6 FL (ref 6–10)
POTASSIUM BLD-SCNC: 3.8 MMOL/L (ref 3.5–5.3)
PROT SERPL-MCNC: 6.9 G/DL (ref 6–8)
RBC # BLD AUTO: 3.67 10*6/MM3 (ref 4.2–5.4)
SODIUM BLD-SCNC: 139 MMOL/L (ref 135–153)
WBC NRBC COR # BLD: 8.78 10*3/MM3 (ref 4.5–12.5)

## 2018-03-03 PROCEDURE — 82962 GLUCOSE BLOOD TEST: CPT

## 2018-03-03 PROCEDURE — 80053 COMPREHEN METABOLIC PANEL: CPT | Performed by: INTERNAL MEDICINE

## 2018-03-03 PROCEDURE — 99239 HOSP IP/OBS DSCHRG MGMT >30: CPT | Performed by: INTERNAL MEDICINE

## 2018-03-03 PROCEDURE — 85027 COMPLETE CBC AUTOMATED: CPT | Performed by: INTERNAL MEDICINE

## 2018-03-03 PROCEDURE — 93010 ELECTROCARDIOGRAM REPORT: CPT | Performed by: INTERNAL MEDICINE

## 2018-03-03 PROCEDURE — 93005 ELECTROCARDIOGRAM TRACING: CPT | Performed by: INTERNAL MEDICINE

## 2018-03-03 PROCEDURE — 94799 UNLISTED PULMONARY SVC/PX: CPT

## 2018-03-03 RX ORDER — ATORVASTATIN CALCIUM 10 MG/1
10 TABLET, FILM COATED ORAL NIGHTLY
Qty: 30 TABLET | Refills: 6 | Status: SHIPPED | OUTPATIENT
Start: 2018-03-03 | End: 2022-02-21

## 2018-03-03 RX ORDER — DOCUSATE SODIUM 100 MG/1
100 CAPSULE, LIQUID FILLED ORAL ONCE
Status: COMPLETED | OUTPATIENT
Start: 2018-03-03 | End: 2018-03-03

## 2018-03-03 RX ORDER — NITROGLYCERIN 0.4 MG/1
0.4 TABLET SUBLINGUAL
Qty: 30 TABLET | Refills: 12 | Status: SHIPPED | OUTPATIENT
Start: 2018-03-03

## 2018-03-03 RX ORDER — CLOPIDOGREL BISULFATE 75 MG/1
75 TABLET ORAL DAILY
Qty: 30 TABLET | Refills: 6 | Status: SHIPPED | OUTPATIENT
Start: 2018-03-03 | End: 2022-02-21

## 2018-03-03 RX ADMIN — TIZANIDINE 4 MG: 4 TABLET ORAL at 14:38

## 2018-03-03 RX ADMIN — CLONIDINE HYDROCHLORIDE 0.1 MG: 0.1 TABLET ORAL at 07:44

## 2018-03-03 RX ADMIN — LOSARTAN POTASSIUM 100 MG: 50 TABLET, FILM COATED ORAL at 07:44

## 2018-03-03 RX ADMIN — ASPIRIN 81 MG: 81 TABLET, CHEWABLE ORAL at 07:44

## 2018-03-03 RX ADMIN — ATORVASTATIN CALCIUM 10 MG: 10 TABLET, FILM COATED ORAL at 20:59

## 2018-03-03 RX ADMIN — DOCUSATE SODIUM 100 MG: 100 CAPSULE, LIQUID FILLED ORAL at 20:59

## 2018-03-03 RX ADMIN — CLOPIDOGREL 75 MG: 75 TABLET, FILM COATED ORAL at 07:44

## 2018-03-03 RX ADMIN — ALLOPURINOL 100 MG: 100 TABLET ORAL at 20:59

## 2018-03-03 RX ADMIN — FUROSEMIDE 20 MG: 20 TABLET ORAL at 07:44

## 2018-03-03 RX ADMIN — SODIUM CHLORIDE 1000 ML: 9 INJECTION, SOLUTION INTRAVENOUS at 13:57

## 2018-03-03 NOTE — DISCHARGE INSTR - APPOINTMENTS
Jonathan isidro  Office  Closed  On  Weekend  And  Also  Dr Lin  Office  Will  Call  Monday March 5   And  Call  Pt  With  apt

## 2018-03-03 NOTE — PROGRESS NOTES
"Reason for follow-up:  Chest pain suggestive angina associated with indeterminant troponin I level    Subjective:    Patient last night developed hypotension and bradycardia possibly due to metoprolol.  Blood pressure dropped to 70/40 mmHg and heart rate was 45-50 bpm.  Patient recovered after IV fluid administration and dopamine infusion which was stopped this morning as blood pressure became stable.  Metoprolol was stopped.    As patient has chest pain suggestive angina associated with indeterminant troponin I level and wall motion abnormalities on echocardiogram, coronary arteriogram was done and it showed 80% stenosis in RCA for which she had bare-metal stent as she is scheduled for gallbladder surgery near future.      Medication Review:     allopurinol 100 mg Oral Nightly   aspirin 81 mg Oral Daily   CloNIDine 0.1 mg Oral Q12H   [START ON 3/3/2018] clopidogrel 75 mg Oral Daily   furosemide 20 mg Oral Daily   losartan 100 mg Oral Daily         Vital Sign Min/Max for last 24 hours  Temp  Min: 97.3 °F (36.3 °C)  Max: 98 °F (36.7 °C)   BP  Min: 82/40  Max: 164/86   Pulse  Min: 54  Max: 80   Resp  Min: 16  Max: 20   SpO2  Min: 93 %  Max: 99 %   Flow (L/min)  Min: 2  Max: 2   No Data Recorded     Flowsheet Rows         First Filed Value    Admission Height  167.6 cm (66\") Documented at 03/01/2018 0000    Admission Weight  90.9 kg (200 lb 8 oz) Documented at 03/01/2018 0000          Physical Exam:     General Appearance:    Alert, cooperative, in no acute distress   Head:    Normocephalic, without obvious abnormality, atraumatic   Eyes:            Lids and lashes normal, conjunctivae and sclerae normal, no   icterus, no pallor, corneas clear, PERRLA   Ears:    Ears appear intact with no abnormalities noted   Throat:   No oral lesions, no thrush, oral mucosa moist   Neck:   No adenopathy, supple, trachea midline, no thyromegaly, no   carotid bruit, no JVD   Back:     No kyphosis present, no scoliosis present, no skin " lesions,      erythema or scars, no tenderness to percussion or                   palpation,   range of motion normal   Lungs:     Clear to auscultation,respirations regular, even and                  unlabored    Heart:    Regular rhythm and normal rate, normal S1 and S2, no            murmur, no gallop, no rub, no click   Chest Wall:    No abnormalities observed   Abdomen:     Normal bowel sounds, no masses, no organomegaly, soft        non-tender, non-distended, no guarding, no rebound                tenderness   Rectal:     Deferred   Extremities:   Moves all extremities well, no edema, no cyanosis, no             redness            Telemetry  Normal sinus rhythm      Cardiac catheterization/PCI report      Findings:     Coronary anatomy:     The left main coronary artery bifurcated into the LAD and left circumflex coronary.  The LAD coursed in the anterior interventricular groove, gave rise to diagonal branches and reached the apex.     Left circumflex coursed in the left atrial ventricular groove and gives rise to several marginal branches.     The right coronary artery course in the right atrial ventricular groove I gave rise to several acute marginal branches.                                                   Dominant vessel: RCA                                         LM: Patent, short                                      LAD: Patent, mildly irregular                                        Diagonal Branch: Patent                                         LCX: Patent                                        Obtuse marginal branch:   Patent                                                    RCA: Mid 80% focal stenosis      LVEF: NA  LVEDP: NA  Aortic Gradient: NA     Coronary Intervention     Due to the above findings and clinical history PCI was performed in the Right coronary artery. A properly fitting 6 Taiwanese guiding catheter was advanced to the coronary artery and 0.014 [employer] coronary guidewire was utilized to  cross the lesion. PCI was performed with balloon angioplasty followed by BMS stent placement due to patient request in the setting of need for cholecystectomy. The critical lesion was treated and post-procedural angiography revealed 0% residual stenosis and GABRIEL III flow. No complications were noted and all equipment removed.       IVUS preformed and showed excellent stent deployment and apposition.         Impression: ACS with single vessel CAD  Successful RCA PCI BMS        Post-operative Diagnosis:  ACS     Ryland Marina MD        Labs    Results from last 7 days  Lab Units 03/01/18  0050   WBC 10*3/mm3 9.33   HEMOGLOBIN g/dL 11.3*   HEMATOCRIT % 35.8*   PLATELETS 10*3/mm3 280       Results from last 7 days  Lab Units 03/01/18  0050   SODIUM mmol/L 138   POTASSIUM mmol/L 3.7   CHLORIDE mmol/L 103   CO2 mmol/L 25.9   BUN mg/dL 26*   CREATININE mg/dL 1.19   CALCIUM mg/dL 9.8   GLUCOSE mg/dL 95       Results from last 7 days  Lab Units 03/01/18  0050   BILIRUBIN mg/dL 0.5   ALK PHOS U/L 63   AST (SGOT) U/L 17   ALT (SGPT) U/L 16                   Results from last 7 days  Lab Units 03/02/18  0211 03/01/18 2015 03/01/18  1536  03/01/18  0729 03/01/18  0050   CK TOTAL U/L  --   --   --   --  73 76   TROPONIN I ng/mL 0.063* 0.070* 0.085*  < > 0.095*  --    CK MB INDEX %  --   --   --   --  1.7  --    < > = values in this interval not displayed.          Assessment    1.  Angina associated with indeterminant troponin I and wall motion abnormalities  2.  Arteriosclerotic CAD status post bare-metal stent to RCA done today  3.  History of symptomatic PVCs on flecainide therapy  4.  Hypertension-well controlled  5.  Hyperlipidemia with intolerance to statin  6.  DM type II    Plan    1.  Medications-     Continue aspirin plus Plavix     Started low dose Lipitor 10 mg by mouth daily and will watch for any side effects because of history of intolerance     No beta blockers as she is intolerant    2.  Flecainide was stopped  because of presence of coronary artery disease    3.  If stable, patient can be discharged in the a.m. with arrangement for follow-up in my office in 2-3 weeks    .    Sheri Lin MD  03/02/18  9:22 PM

## 2018-03-03 NOTE — PLAN OF CARE
Problem: Patient Care Overview (Adult)  Goal: Plan of Care Review  Outcome: Ongoing (interventions implemented as appropriate)    Goal: Discharge Needs Assessment  Outcome: Ongoing (interventions implemented as appropriate)      Problem: Fall Risk (Adult)  Goal: Identify Related Risk Factors and Signs and Symptoms  Outcome: Ongoing (interventions implemented as appropriate)    Goal: Absence of Falls  Outcome: Ongoing (interventions implemented as appropriate)      Problem: Skin Integrity Impairment, Risk/Actual (Adult)  Goal: Identify Related Risk Factors and Signs and Symptoms  Outcome: Ongoing (interventions implemented as appropriate)    Goal: Skin Integrity/Wound Healing  Outcome: Ongoing (interventions implemented as appropriate)      Problem: Pain, Acute (Adult)  Goal: Identify Related Risk Factors and Signs and Symptoms  Outcome: Ongoing (interventions implemented as appropriate)    Goal: Acceptable Pain Control/Comfort Level  Outcome: Ongoing (interventions implemented as appropriate)      Problem: Diabetes, Type 2 (Adult)  Goal: Signs and Symptoms of Listed Potential Problems Will be Absent or Manageable (Diabetes, Type 2)  Outcome: Ongoing (interventions implemented as appropriate)

## 2018-03-03 NOTE — DISCHARGE SUMMARY
Date of Discharge:  3/3/2018    Discharge Diagnosis:   1.  Unstable angina.  2.  ASCVD.  3.  Recurrent PVCs.  4.  Hypertension.  5.  Diabetes mellitus type 2.    Presenting Problem/History of Present Illness  Chest pain [R07.9]  Chest pain due to myocardial ischemia, unspecified ischemic chest pain type [I20.9]     62-year-old woman has been admitted with history of chest tightness.      She developed chest pain yesterday morning which is retrosternal,, tightness, moderate in intensity, intermittent, with no definite aggravating or relieving factors and no radiation.  She visited the emergency department at [facility] from that she was transferred here for further care.  Her troponin level was indeterminate-0.06.  ECG showed normal sinus rhythm no evidence of ischemia.  After admission to discuss acidity, she remained chest pain-free.  No history of associated nausea or vomiting.  No history of diaphoresis.      Effort tolerance is good.  No history of PND, orthopnea or leg edema.  She has history of occasional palpitations due to PVCs.  No history of dizziness.      She has history of PVCs for which she takes flecainide.  No history of known coronary artery disease or CHF.  A nuclear stress test done in 8/2017 showed no evidence of ischemia and LVEF was normal.  Echocardiogram done 7/2017 showed normal LVEF and wall motion and no evidence of significant valvular heart disease.      Cardiac risk factors include history of DM type II, hypertension and hyperlipidemia.  History of COPD.  She quit smoking several years ago.  Recently diagnosed with a polyp in the gallbladder and been scheduled for gallbladder surgery next week by Dr. [facility]cc[facility].    Hospital Course  The patient was admitted to the cardiology service under Dr. Lin.  She continued to present with intermittent episodes of chest pain.  Her troponins were noted to be minimally elevated but an echocardiogram was performed and she was  noted to have wall motion abnormalities.  She underwent cardiac catheterization with evidence of obstruction to the right coronary artery.  A bare metal stent was placed with resolution of the obstruction.  The patient has since been chest pain-free.  According to the patient she is now feeling well and denies any chest pain, shortness of breath or palpitations.  The catheter site appears dry and clean.  He does show some bruising around the right wrist but no evidence of significant hematoma or infection.  At this point the patient has reached maximum benefit from this hospitalization and is to be discharged home.  She is to follow-up with Dr. Lin in 2 weeks.    Procedures Performed  Procedure(s):  Left Heart Cath on 3/2/2018    Coronary anatomy:     The left main coronary artery bifurcated into the LAD and left circumflex coronary.  The LAD coursed in the anterior interventricular groove, gave rise to diagonal branches and reached the apex.     Left circumflex coursed in the left atrial ventricular groove and gives rise to several marginal branches.     The right coronary artery course in the right atrial ventricular groove I gave rise to several acute marginal branches.                                                   Dominant vessel: RCA                                         LM: Patent, short                                      LAD: Patent, mildly irregular                                        Diagonal Branch: Patent                                         LCX: Patent                                        Obtuse marginal branch:   Patent                                                    RCA: Mid 80% focal stenosis      LVEF: NA  LVEDP: NA  Aortic Gradient: NA     Coronary Intervention     Due to the above findings and clinical history PCI was performed in the Right coronary artery. A properly fitting 6 Syriac guiding catheter was advanced to the coronary artery and 0.014 [employer] coronary guidewire was  utilized to cross the lesion. PCI was performed with balloon angioplasty followed by BMS stent placement due to patient request in the setting of need for cholecystectomy. The critical lesion was treated and post-procedural angiography revealed 0% residual stenosis and GABRIEL III flow. No complications were noted and all equipment removed.       IVUS preformed and showed excellent stent deployment and apposition.         Impression: ACS with single vessel CAD  Successful RCA PCI BMS         Consults:   Consults     Date and Time Order Name Status Description    3/2/2018 0837 Inpatient Cardiology Consult Completed           Condition on Discharge: Stable    Vital Signs  Temp:  [97.3 °F (36.3 °C)-98.5 °F (36.9 °C)] 98.2 °F (36.8 °C)  Heart Rate:  [66-85] 76  Resp:  [16-18] 18  BP: (111-156)/(60-96) 111/67    Physical Exam:     General Appearance:    Alert, cooperative, in no acute distress   Head:    Normocephalic, without obvious abnormality, atraumatic   Eyes:            Lids and lashes normal, conjunctivae and sclerae normal, no   icterus, no pallor, corneas clear, PERRLA   Throat:   No oral lesions, no thrush, oral mucosa moist   Neck:   No adenopathy, supple, trachea midline, no thyromegaly, no     carotid bruit, no JVD   Back:     No kyphosis present, no scoliosis present, no skin lesions,       erythema or scars, no tenderness to percussion or                   palpation,   range of motion normal   Lungs:     Clear to auscultation,respirations regular, even and                   unlabored    Heart:    Regular rhythm and normal rate, normal S1 and S2, no            murmur, no gallop, no rub, no click   Abdomen:     Normal bowel sounds, no masses, no organomegaly, soft        non-tender, non-distended, no guarding, no rebound                 tenderness   Extremities:   Moves all extremities well, no edema, no cyanosis, no              redness   Pulses:   Pulses palpable and equal bilaterally   Skin:   No bleeding,  bruising or rash. Site is clean and dry but shows some bruising.     Lymph nodes:   No palpable adenopathy   Neurologic:   Cranial nerves 2 - 12 grossly intact, sensation intact, DTR        present and equal bilaterally       Discharge Disposition  Home or Self Care    Discharge Medications   Annette Suarez   Home Medication Instructions JILL:[id number]    Printed on:03/03/18 1127   Medication Information                      allopurinol (ZYLOPRIM) 100 MG tablet  Take 100 mg by mouth Every Night.             aspirin 81 MG tablet  Take 81 mg by mouth Daily.             atorvastatin (LIPITOR) 10 MG tablet  Take 1 tablet by mouth Every Night.             cloNIDine (CATAPRES) 0.1 MG tablet  Take 0.1 mg by mouth 2 (Two) Times a Day.             clopidogrel (PLAVIX) 75 MG tablet  Take 1 tablet by mouth Daily.             furosemide (LASIX) 20 MG tablet  take 1 tablet by mouth once daily             losartan (COZAAR) 100 MG tablet  Take 100 mg by mouth Daily.             melatonin 5 MG tablet tablet  Take 5 mg by mouth Every Night.             metFORMIN (GLUCOPHAGE) 500 MG tablet  Take 500 mg by mouth Daily.             nitroglycerin (NITROSTAT) 0.4 MG SL tablet  Place 1 tablet under the tongue Every 5 (Five) Minutes As Needed for Chest Pain (if systolic BP greater than 100 mm/Hg.).             tiZANidine (ZANAFLEX) 4 MG tablet  Take 4 mg by mouth Every 8 (Eight) Hours As Needed.                 Discharge Diet:   Diet Instructions     Diet: Cardiac, Consistent Carbohydrate; Thin       Discharge Diet:   Cardiac  Consistent Carbohydrate      Fluid Consistency:  Thin                 Activity at Discharge:   Activity Instructions     Activity as Tolerated                     Follow-up Appointments  No future appointments.  Additional Instructions for the Follow-ups that You Need to Schedule     Discharge Follow-up with PCP    As directed    Follow Up Details:  2 weeks           Discharge Follow-up with Specified Provider:   Riley; 2 Weeks    As directed    To:  Dr Lin    Follow Up:  2 Weeks                            Gavin Galarza MD  03/03/18  11:22 AM    Time: Discharge 45 min    Addendum: The patient was noted to have an abnormal renal function yesterday and was kept for monitoring overnight.  Her renal function significantly improved today.  I discussed with the patient the option of staying one more day to ensure her renal function returns normal.  She is currently adamant that she wants to go home as she has a systolic son she needs to take care of.  I explained to her the risks of renal dysfunction if it has not followed closely.  According to the patient she will be seen by her primary care provider in one week and will have repeat labs to evaluate her renal function at that time.  I'll therefore discharge the patient to continue close follow-up with her primary care provider.  Will discontinue the patient's Lasix until her renal function recovers.    Physical Exam   Constitutional: She is oriented to person, place, and time. She appears well-developed and well-nourished.   Obese white female lying comfortably on bed.   HENT:   Mouth/Throat: Oropharynx is clear and moist.   Eyes: EOM are normal. Pupils are equal, round, and reactive to light.   Neck: Neck supple. No JVD present. No tracheal deviation present. No thyromegaly present.   Cardiovascular: Normal rate, regular rhythm, S1 normal and S2 normal.  Exam reveals no gallop and no friction rub.    No murmur heard.  Pulmonary/Chest: Effort normal and breath sounds normal. No respiratory distress. She has no wheezes. She has no rales.   Abdominal: Soft. Bowel sounds are normal. She exhibits no mass. There is no tenderness.   Musculoskeletal: Normal range of motion. She exhibits no edema.   Lymphadenopathy:     She has no cervical adenopathy.   Neurological: She is alert and oriented to person, place, and time.   Skin: Skin is warm and dry. No rash noted.    Catheter site is clean and dry.   Psychiatric: She has a normal mood and affect.

## 2018-03-03 NOTE — PROGRESS NOTES
LOS: 1 day   Patient Care Team:  Jonathan Kelly MD as PCP - General  Jonathan Kelly MD as PCP - Family Medicine      Subjective         Admission information:    Patient last night developed hypotension and bradycardia possibly due to metoprolol.  Blood pressure dropped to 70/40 mmHg and heart rate was 45-50 bpm.  Patient recovered after IV fluid administration and dopamine infusion which was stopped this morning as blood pressure became stable.  Metoprolol was stopped.     As patient has chest pain suggestive angina associated with indeterminant troponin I level and wall motion abnormalities on echocardiogram, coronary arteriogram was done and it showed 80% stenosis in RCA for which she had bare-metal stent as she is scheduled for gallbladder surgery near future.    Reason for follow- up: Unstable angina status post stent placement.    Interval History:     According to the patient she is doing well today.  She denies any chest pain, shortness breath or palpitations.    History taken from: patient chart family    Vital Signs  Temp:  [97.3 °F (36.3 °C)-98.5 °F (36.9 °C)] 98.2 °F (36.8 °C)  Heart Rate:  [66-85] 76  Resp:  [16-18] 18  BP: (111-156)/(60-96) 111/67    Physical Exam:     Physical Exam   Constitutional: She is oriented to person, place, and time. She appears well-developed and well-nourished.   Obese white female lying comfortably on bed.   HENT:   Mouth/Throat: Oropharynx is clear and moist.   Eyes: EOM are normal. Pupils are equal, round, and reactive to light.   Neck: Neck supple. No JVD present. No tracheal deviation present. No thyromegaly present.   Cardiovascular: Normal rate, regular rhythm, S1 normal and S2 normal.  Exam reveals no gallop and no friction rub.    No murmur heard.  Pulmonary/Chest: Effort normal and breath sounds normal. No respiratory distress. She has no wheezes. She has no rales.   Abdominal: Soft. Bowel sounds are normal. She exhibits no mass. There is no  tenderness.   Musculoskeletal: Normal range of motion. She exhibits no edema.   Lymphadenopathy:     She has no cervical adenopathy.   Neurological: She is alert and oriented to person, place, and time.   Skin: Skin is warm and dry. No rash noted.   Catheter site is clean and dry.   Psychiatric: She has a normal mood and affect.       Lab Results (last 24 hours)     Procedure Component Value Units Date/Time    POC Glucose Once [[id number]]  (Normal) Collected:  03/03/18 0740    Specimen:  Blood Updated:  03/03/18 0748     Glucose 94 mg/dL     Narrative:       Meter: [id number] : [id number] DARSHANA MONA    CBC (No Diff) [[id number]]  (Abnormal) Collected:  03/03/18 1123    Specimen:  Blood Updated:  03/03/18 1142     WBC 8.78 10*3/mm3      RBC 3.67 (L) 10*6/mm3      Hemoglobin 10.7 (L) g/dL      Hematocrit 33.5 (L) %      MCV 91.3 fL      MCH 29.2 pg      MCHC 31.9 (L) g/dL      RDW 13.2 %      RDW-SD 43.8 fl      MPV 10.6 (H) fL      Platelets 260 10*3/mm3     Comprehensive Metabolic Panel [[id number]]  (Abnormal) Collected:  03/03/18 1123    Specimen:  Blood Updated:  03/03/18 1210     Glucose 85 mg/dL      BUN 25 (H) mg/dL      Creatinine 1.71 (H) mg/dL      Sodium 139 mmol/L      Potassium 3.8 mmol/L      Chloride 108 mmol/L      CO2 24.1 (L) mmol/L      Calcium 9.4 mg/dL      Total Protein 6.9 g/dL      Albumin 4.10 g/dL      ALT (SGPT) 11 U/L      AST (SGOT) 16 U/L      Alkaline Phosphatase 64 U/L       Note New Reference Ranges        Total Bilirubin 0.3 mg/dL      eGFR Non African Amer 30 (L) mL/min/1.73      Globulin 2.8 gm/dL      A/G Ratio 1.5 g/dL      BUN/Creatinine Ratio 14.6     Anion Gap 6.9 mmol/L     Osmolality, Calculated [[id number]]  (Normal) Collected:  03/03/18 1123    Specimen:  Blood Updated:  03/03/18 1210     Osmolality Calc 281.2 mOsm/kg           Medication:  Scheduled Meds:  allopurinol 100 mg Oral Nightly   aspirin 81 mg Oral Daily   atorvastatin 10 mg Oral Nightly   CloNIDine 0.1 mg  Oral Q12H   clopidogrel 75 mg Oral Daily   furosemide 20 mg Oral Daily   losartan 100 mg Oral Daily     Continuous Infusions:   PRN Meds:.nitroglycerin  •  ondansetron  •  tiZANidine    Telemetry: Sinus rhythm in the 70s to 80s.      Assessment/Plan     1.  Unstable angina: Patient with unstable angina status post stent placement by Dr. Vo yesterday to the RCA.  She is currently on full medical therapy including aspirin, statin, Plavix and ARB.  She is not on a beta blocker for an unclear reason.  At this point will need to discontinue ARB due to renal dysfunction and start the patient on a beta blocker.     2.  Acute renal failure: Patient with acute renal failure for creatinine 1.7 up from 1.2 two days ago.  At this point we will get the patient a liter of normal saline to hydrate adequately.  We'll need to monitor renal function.  Will discontinue ARB as above.    3.  ASCVD: Patient with a history of ASCVD status post bare-metal stent to the RCA.  Will continue medical therapy with changes as above.    4.  Symptomatic PVCs: Patient with a history of asymptomatic PVCs present flecainide.  This is now been discontinued due to recently diagnosed coronary artery disease.    5.  Hypertension: Patient hypotensive appears to be well-controlled on current regimen.  We will monitor for stability after changes as above.    6.  Dyslipidemia: Patient with a history of dyslipidemia.  At this point will increase atorvastatin to 80 mg by mouth daily as she has recently had a stent placed.    7.  Diabetes mellitus type 2: Tissue of a history of diabetes mellitus type 2 currently well controlled.  We'll continue current regimen.    Disposition: The patient was supposed to be discharged today however she was noted to have an elevated creatinine on laboratory check.  Her discharge has been held.    Gavin Galarza MD  03/03/18  12:18 PM    Dragon disclaimer:  Much of this encounter note is an electronic  transcription/translation of spoken language to printed text. The electronic translation of spoken language may permit erroneous, or at times, nonsensical words or phrases to be inadvertently transcribed; Although I have reviewed the note for such errors, some may still exist.

## 2018-03-03 NOTE — PROGRESS NOTES
Discharge Planning Assessment  GINNY Ha     Patient Name: Annette Suarez  MRN: [id number]  Today's Date: 3/3/2018    Admit Date: 2/28/2018          Discharge Needs Assessment     None            Discharge Plan       03/03/18 1138    Final Note    Final Note Pt being discharged home on this date.  No new orders.          Discharge Placement     No information found        Expected Discharge Date and Time     Expected Discharge Date Expected Discharge Time    Mar 3, 2018               Demographic Summary     None            Functional Status     None            Psychosocial     None            Abuse/Neglect     None            Legal     None            Substance Abuse     None            Patient Forms     None          Nayeli Ramos

## 2018-03-04 VITALS
TEMPERATURE: 98.4 F | DIASTOLIC BLOOD PRESSURE: 96 MMHG | SYSTOLIC BLOOD PRESSURE: 156 MMHG | BODY MASS INDEX: 31.88 KG/M2 | RESPIRATION RATE: 18 BRPM | OXYGEN SATURATION: 95 % | WEIGHT: 198.4 LBS | HEIGHT: 66 IN | HEART RATE: 75 BPM

## 2018-03-04 LAB
ALBUMIN SERPL-MCNC: 4 G/DL (ref 3.4–4.8)
ALBUMIN/GLOB SERPL: 1.5 G/DL (ref 1.5–2.5)
ALP SERPL-CCNC: 60 U/L (ref 35–104)
ALT SERPL W P-5'-P-CCNC: 9 U/L (ref 10–36)
ANION GAP SERPL CALCULATED.3IONS-SCNC: 6.6 MMOL/L (ref 3.6–11.2)
AST SERPL-CCNC: 16 U/L (ref 10–30)
BILIRUB SERPL-MCNC: 0.3 MG/DL (ref 0.2–1.8)
BUN BLD-MCNC: 21 MG/DL (ref 7–21)
BUN/CREAT SERPL: 14.5 (ref 7–25)
CALCIUM SPEC-SCNC: 9.2 MG/DL (ref 7.7–10)
CHLORIDE SERPL-SCNC: 109 MMOL/L (ref 99–112)
CO2 SERPL-SCNC: 25.4 MMOL/L (ref 24.3–31.9)
CREAT BLD-MCNC: 1.45 MG/DL (ref 0.43–1.29)
DEPRECATED RDW RBC AUTO: 42.5 FL (ref 37–54)
ERYTHROCYTE [DISTWIDTH] IN BLOOD BY AUTOMATED COUNT: 13.2 % (ref 11.5–14.5)
GFR SERPL CREATININE-BSD FRML MDRD: 37 ML/MIN/1.73
GLOBULIN UR ELPH-MCNC: 2.6 GM/DL
GLUCOSE BLD-MCNC: 84 MG/DL (ref 70–110)
GLUCOSE BLDC GLUCOMTR-MCNC: 84 MG/DL (ref 70–130)
GLUCOSE BLDC GLUCOMTR-MCNC: 93 MG/DL (ref 70–130)
HCT VFR BLD AUTO: 32.6 % (ref 37–47)
HGB BLD-MCNC: 10.2 G/DL (ref 12–16)
MCH RBC QN AUTO: 29.3 PG (ref 27–33)
MCHC RBC AUTO-ENTMCNC: 31.3 G/DL (ref 33–37)
MCV RBC AUTO: 93.7 FL (ref 80–94)
OSMOLALITY SERPL CALC.SUM OF ELEC: 283.4 MOSM/KG (ref 273–305)
PLATELET # BLD AUTO: 259 10*3/MM3 (ref 130–400)
PMV BLD AUTO: 10.5 FL (ref 6–10)
POTASSIUM BLD-SCNC: 4.2 MMOL/L (ref 3.5–5.3)
PROT SERPL-MCNC: 6.6 G/DL (ref 6–8)
RBC # BLD AUTO: 3.48 10*6/MM3 (ref 4.2–5.4)
SODIUM BLD-SCNC: 141 MMOL/L (ref 135–153)
WBC NRBC COR # BLD: 8.07 10*3/MM3 (ref 4.5–12.5)

## 2018-03-04 PROCEDURE — 82962 GLUCOSE BLOOD TEST: CPT

## 2018-03-04 PROCEDURE — 80053 COMPREHEN METABOLIC PANEL: CPT | Performed by: INTERNAL MEDICINE

## 2018-03-04 PROCEDURE — 85027 COMPLETE CBC AUTOMATED: CPT | Performed by: INTERNAL MEDICINE

## 2018-03-04 PROCEDURE — 94799 UNLISTED PULMONARY SVC/PX: CPT

## 2018-03-04 RX ORDER — ACETAMINOPHEN 325 MG/1
650 TABLET ORAL EVERY 6 HOURS PRN
Status: DISCONTINUED | OUTPATIENT
Start: 2018-03-04 | End: 2018-03-04 | Stop reason: HOSPADM

## 2018-03-04 RX ADMIN — ASPIRIN 81 MG: 81 TABLET, CHEWABLE ORAL at 07:33

## 2018-03-04 RX ADMIN — CLOPIDOGREL 75 MG: 75 TABLET, FILM COATED ORAL at 07:35

## 2018-03-04 RX ADMIN — SODIUM CHLORIDE 1000 ML: 9 INJECTION, SOLUTION INTRAVENOUS at 13:43

## 2018-03-04 RX ADMIN — CLONIDINE HYDROCHLORIDE 0.1 MG: 0.1 TABLET ORAL at 03:25

## 2018-03-04 RX ADMIN — LOSARTAN POTASSIUM 100 MG: 50 TABLET, FILM COATED ORAL at 07:33

## 2018-03-04 RX ADMIN — CLONIDINE HYDROCHLORIDE 0.1 MG: 0.1 TABLET ORAL at 11:43

## 2018-03-04 RX ADMIN — ACETAMINOPHEN 650 MG: 325 TABLET, FILM COATED ORAL at 08:48

## 2018-03-04 RX ADMIN — FUROSEMIDE 20 MG: 20 TABLET ORAL at 07:33

## 2018-03-04 NOTE — PLAN OF CARE
Problem: Patient Care Overview (Adult)  Goal: Plan of Care Review  Outcome: Ongoing (interventions implemented as appropriate)    Goal: Discharge Needs Assessment  Outcome: Ongoing (interventions implemented as appropriate)      Problem: Fall Risk (Adult)  Goal: Identify Related Risk Factors and Signs and Symptoms  Outcome: Ongoing (interventions implemented as appropriate)    Goal: Absence of Falls  Outcome: Ongoing (interventions implemented as appropriate)      Problem: Pain, Acute (Adult)  Goal: Identify Related Risk Factors and Signs and Symptoms  Outcome: Ongoing (interventions implemented as appropriate)    Goal: Acceptable Pain Control/Comfort Level  Outcome: Ongoing (interventions implemented as appropriate)      Problem: Diabetes, Type 2 (Adult)  Goal: Signs and Symptoms of Listed Potential Problems Will be Absent or Manageable (Diabetes, Type 2)  Outcome: Ongoing (interventions implemented as appropriate)

## 2018-03-05 ENCOUNTER — TELEPHONE (OUTPATIENT)
Dept: TELEMETRY | Facility: HOSPITAL | Age: 63
End: 2018-03-05

## 2018-04-09 ENCOUNTER — TELEPHONE (OUTPATIENT)
Dept: CARDIAC REHAB | Facility: HOSPITAL | Age: 63
End: 2018-04-09

## 2018-05-10 ENCOUNTER — DOCUMENTATION (OUTPATIENT)
Dept: CARDIAC REHAB | Facility: HOSPITAL | Age: 63
End: 2018-05-10

## 2018-05-10 NOTE — PROGRESS NOTES
We had attempted to reach patient on 3/27/18, 4/5/18, 4/27/18 left message to call us back. Spoke with her and she stated she was not interested using her home gym

## 2020-02-24 ENCOUNTER — TRANSCRIBE ORDERS (OUTPATIENT)
Dept: ADMINISTRATIVE | Facility: HOSPITAL | Age: 65
End: 2020-02-24

## 2020-02-24 DIAGNOSIS — R10.811 RIGHT UPPER QUADRANT ABDOMINAL TENDERNESS, REBOUND TENDERNESS PRESENCE NOT SPECIFIED: Primary | ICD-10-CM

## 2020-02-27 ENCOUNTER — HOSPITAL ENCOUNTER (OUTPATIENT)
Dept: ULTRASOUND IMAGING | Facility: HOSPITAL | Age: 65
Discharge: HOME | End: 2020-02-27
Admitting: PHYSICIAN ASSISTANT

## 2020-02-27 DIAGNOSIS — R10.811 RIGHT UPPER QUADRANT ABDOMINAL TENDERNESS, REBOUND TENDERNESS PRESENCE NOT SPECIFIED: ICD-10-CM

## 2020-02-27 PROCEDURE — 76705 ECHO EXAM OF ABDOMEN: CPT

## 2020-02-27 PROCEDURE — 76705 ECHO EXAM OF ABDOMEN: CPT | Performed by: RADIOLOGY

## 2020-03-27 ENCOUNTER — OFFICE VISIT (OUTPATIENT)
Dept: SURGERY | Facility: CLINIC | Age: 65
End: 2020-03-27

## 2020-03-27 VITALS
WEIGHT: 215 LBS | HEART RATE: 82 BPM | BODY MASS INDEX: 34.55 KG/M2 | HEIGHT: 66 IN | DIASTOLIC BLOOD PRESSURE: 94 MMHG | SYSTOLIC BLOOD PRESSURE: 141 MMHG

## 2020-03-27 DIAGNOSIS — R10.11 RIGHT UPPER QUADRANT ABDOMINAL PAIN: Primary | ICD-10-CM

## 2020-03-27 PROCEDURE — 99203 OFFICE O/P NEW LOW 30 MIN: CPT | Performed by: SURGERY

## 2020-03-27 RX ORDER — DULOXETIN HYDROCHLORIDE 30 MG/1
30 CAPSULE, DELAYED RELEASE ORAL DAILY
COMMUNITY

## 2020-03-27 RX ORDER — OMEPRAZOLE 40 MG/1
40 CAPSULE, DELAYED RELEASE ORAL DAILY
Qty: 30 CAPSULE | Refills: 1 | Status: SHIPPED | OUTPATIENT
Start: 2020-03-27 | End: 2020-04-26

## 2020-03-27 RX ORDER — FUROSEMIDE 20 MG/1
20 TABLET ORAL 2 TIMES DAILY
COMMUNITY
End: 2022-02-21

## 2020-03-27 NOTE — PROGRESS NOTES
Subjective   Annette Galarza is a 65 y.o. female here for gallbladder.    History of Present Illness  Ms. Suarez was seen in the office today for evaluation of gallbladder polyps.  She states that these were initially diagnosed 2 years ago when she presented with left upper quadrant pain.  Patient states she was actually scheduled for surgery but then had a heart attack and it was postponed.  Patient states that she has pain every time she eats both in the right upper quadrant and the left upper quadrant.  She states she has nausea but without vomiting.  She states there are no specific food triggers which make the pain worse.  She does state that at times the pain is on the right side but other times it seems to be more on the left.  She does have a history of heartburn for which she has taken Zantac which did help the heartburn but not the symptoms.  Mrs. Galarza had an ultrasound of the abdomen which demonstrated some echogenic foci in the gallbladder which were thought to possibly represent tiny polyps.  Mrs. Galarza denies any history of weight loss related to her symptomatology.    Allergies   Allergen Reactions   • Meperidine Delirium and Hallucinations   • Morphine Delirium, Irritability and Hallucinations   • Adhesive Tape Itching, Dermatitis and Rash     Current Outpatient Medications   Medication Sig Dispense Refill   • allopurinol (ZYLOPRIM) 100 MG tablet Take 100 mg by mouth Every Night.     • aspirin 81 MG tablet Take 81 mg by mouth Daily.     • cloNIDine (CATAPRES) 0.1 MG tablet Take 0.1 mg by mouth 2 (Two) Times a Day.  0   • DULoxetine (CYMBALTA) 30 MG capsule Take 30 mg by mouth Daily.     • furosemide (LASIX) 20 MG tablet Take 20 mg by mouth 2 (Two) Times a Day.     • losartan (COZAAR) 100 MG tablet Take 100 mg by mouth Daily.     • melatonin 5 MG tablet tablet Take 5 mg by mouth Every Night.     • metFORMIN (GLUCOPHAGE) 500 MG tablet Take 500 mg by mouth Daily.     • nitroglycerin  "(NITROSTAT) 0.4 MG SL tablet Place 1 tablet under the tongue Every 5 (Five) Minutes As Needed for Chest Pain (if systolic BP greater than 100 mm/Hg.). 30 tablet 12   • tiZANidine (ZANAFLEX) 4 MG tablet Take 4 mg by mouth Every 8 (Eight) Hours As Needed.  0   • atorvastatin (LIPITOR) 10 MG tablet Take 1 tablet by mouth Every Night. 30 tablet 6   • clopidogrel (PLAVIX) 75 MG tablet Take 1 tablet by mouth Daily. 30 tablet 6     No current facility-administered medications for this visit.      Past Medical History:   Diagnosis Date   • Arthritis    • Asthma    • Diabetic peripheral neuropathy (CMS/HCC)    • Esophageal reflux    • Gout    • Hyperlipidemia    • Hypertension      Past Surgical History:   Procedure Laterality Date   • CARDIAC CATHETERIZATION N/A 3/2/2018    Procedure: Left Heart Cath;  Surgeon: Ryland Marina MD;  Location: [facility] INVASIVE LOCATION;  Service:    •  SECTION     • HYSTERECTOMY       Review of Systems  General: negative  Integumentary: negative  Eyes: negative  ENT: negative  Respiratory: negative  Gastrointestinal: negative  Cardiovascular: negative  Neurological: negative  Psychiatric: negative  Hematologic/Lymphatic: negative  Genitourinary: negative  Musculoskeletal: negative  Endocrine: negative  Breasts: negative    The following portions of the patient's history were reviewed and updated as appropriate: allergies, current medications, past family history, past medical history, past social history, past surgical history and problem list.    Objective   /94   Pulse 82   Ht 167.6 cm (65.98\")   Wt 97.5 kg (215 lb)   BMI 34.72 kg/m²   Physical Exam  General:  This is a WD WN female in no acute distress  HEENT exam:  WNL. Sclera are anicteric.  EOMI  Neck:  supple, FROM, without thyromegaly, cervical or supraclavicular adenopathy  Lungs:  Respiratory effort normal. Auscultation: Clear, without wheezes, rhonchi, rales  Heart:  Regular rate and rhythm, without " murmur, gallop, rub.  No pedal edema  Abdomen: Bowel sounds are present.  The patient reports some mild tenderness to palpation in the left upper quadrant and right upper quadrant.  No palpable mass.  No rebound or guarding.  Musculoskeletal:  muscle strength/tone is normal.  Gait and station: normal. No digital cyanosis  Psyc:  alert, oriented x 3.  Mood and affect are appropriate  skin:  Warm with good turgor.  Without rash or lesion  extremities:  Examination of the extremities revealed no cyanosis, clubbing or edema.    Results/Data  Ultrasound reports and images were reviewed.  See discussion regarding finding of polyps.  Procedures       Assessment/Plan   Right upper quadrant pain and left upper quadrant pain with nausea.  Gallbladder polyps.  These are not definitely present and certainly not the etiology of the patient's symptomatology.    Plan: Trial of omeprazole  Will obtain HIDA scan once radiology imaging restrictions have eased up.  If HIDA scan abnormal would consider laparoscopic cholecystectomy.  If HIDA scan is normal would consider EGD.       Discussion/Summary: I discussed with the patient that gallbladder polyps are typically an incidental finding.  The standard of care would be repeat ultrasound to document their persistent presence as there are many false positives on ultrasound regarding this.  Lastly the only indication were surgery would be recommended for a gallbladder polyp would be for a polyp in excess of 1 cm.    Patient's Body mass index is 34.72 kg/m². BMI is above normal parameters. Recommendations include: educational material.       No future appointments.      Please note that portions of this note were completed with a voice recognition program.  Answers for HPI/ROS submitted by the patient on 3/24/2020   Abdominal pain  What is the primary reason for your visit?: Abdominal Pain

## 2020-03-28 PROBLEM — R10.11 RIGHT UPPER QUADRANT ABDOMINAL PAIN: Status: ACTIVE | Noted: 2020-03-28

## 2020-03-30 DIAGNOSIS — R10.11 RIGHT UPPER QUADRANT ABDOMINAL PAIN: Primary | ICD-10-CM

## 2020-10-16 ENCOUNTER — HOSPITAL ENCOUNTER (EMERGENCY)
Facility: HOSPITAL | Age: 65
Discharge: HOME | End: 2020-10-16
Attending: FAMILY MEDICINE | Admitting: FAMILY MEDICINE

## 2020-10-16 VITALS
HEART RATE: 80 BPM | DIASTOLIC BLOOD PRESSURE: 96 MMHG | RESPIRATION RATE: 18 BRPM | SYSTOLIC BLOOD PRESSURE: 155 MMHG | TEMPERATURE: 98.3 F | HEIGHT: 66 IN | WEIGHT: 200 LBS | BODY MASS INDEX: 32.14 KG/M2 | OXYGEN SATURATION: 98 %

## 2020-10-16 DIAGNOSIS — I10 HYPERTENSION, UNSPECIFIED TYPE: Primary | ICD-10-CM

## 2020-10-16 LAB
ALBUMIN SERPL-MCNC: 4.56 G/DL (ref 3.5–5.2)
ALBUMIN/GLOB SERPL: 1.4 G/DL
ALP SERPL-CCNC: 74 U/L (ref 39–117)
ALT SERPL W P-5'-P-CCNC: 21 U/L (ref 1–33)
ANION GAP SERPL CALCULATED.3IONS-SCNC: 10.4 MMOL/L (ref 5–15)
APTT PPP: 31.6 SECONDS (ref 25.6–35.3)
AST SERPL-CCNC: 20 U/L (ref 1–32)
BASOPHILS # BLD AUTO: 0.04 10*3/MM3 (ref 0–0.2)
BASOPHILS NFR BLD AUTO: 0.5 % (ref 0–1.5)
BILIRUB SERPL-MCNC: 0.4 MG/DL (ref 0–1.2)
BUN SERPL-MCNC: 18 MG/DL (ref 8–23)
BUN/CREAT SERPL: 18.6 (ref 7–25)
CALCIUM SPEC-SCNC: 9.9 MG/DL (ref 8.6–10.5)
CHLORIDE SERPL-SCNC: 100 MMOL/L (ref 98–107)
CO2 SERPL-SCNC: 27.6 MMOL/L (ref 22–29)
CREAT SERPL-MCNC: 0.97 MG/DL (ref 0.57–1)
DEPRECATED RDW RBC AUTO: 43.8 FL (ref 37–54)
EOSINOPHIL # BLD AUTO: 0.17 10*3/MM3 (ref 0–0.4)
EOSINOPHIL NFR BLD AUTO: 2 % (ref 0.3–6.2)
ERYTHROCYTE [DISTWIDTH] IN BLOOD BY AUTOMATED COUNT: 13 % (ref 12.3–15.4)
GFR SERPL CREATININE-BSD FRML MDRD: 58 ML/MIN/1.73
GLOBULIN UR ELPH-MCNC: 3.3 GM/DL
GLUCOSE SERPL-MCNC: 108 MG/DL (ref 65–99)
HCT VFR BLD AUTO: 38.9 % (ref 34–46.6)
HGB BLD-MCNC: 12 G/DL (ref 12–15.9)
IMM GRANULOCYTES # BLD AUTO: 0.03 10*3/MM3 (ref 0–0.05)
IMM GRANULOCYTES NFR BLD AUTO: 0.3 % (ref 0–0.5)
INR PPP: 0.9 (ref 0.9–1.1)
LYMPHOCYTES # BLD AUTO: 2.41 10*3/MM3 (ref 0.7–3.1)
LYMPHOCYTES NFR BLD AUTO: 27.9 % (ref 19.6–45.3)
MCH RBC QN AUTO: 28.4 PG (ref 26.6–33)
MCHC RBC AUTO-ENTMCNC: 30.8 G/DL (ref 31.5–35.7)
MCV RBC AUTO: 92.2 FL (ref 79–97)
MONOCYTES # BLD AUTO: 0.64 10*3/MM3 (ref 0.1–0.9)
MONOCYTES NFR BLD AUTO: 7.4 % (ref 5–12)
NEUTROPHILS NFR BLD AUTO: 5.34 10*3/MM3 (ref 1.7–7)
NEUTROPHILS NFR BLD AUTO: 61.9 % (ref 42.7–76)
NRBC BLD AUTO-RTO: 0 /100 WBC (ref 0–0.2)
NT-PROBNP SERPL-MCNC: 78 PG/ML (ref 0–900)
PLATELET # BLD AUTO: 306 10*3/MM3 (ref 140–450)
PMV BLD AUTO: 10.3 FL (ref 6–12)
POTASSIUM SERPL-SCNC: 4.2 MMOL/L (ref 3.5–5.2)
PROT SERPL-MCNC: 7.9 G/DL (ref 6–8.5)
PROTHROMBIN TIME: 12 SECONDS (ref 11.9–14.1)
RBC # BLD AUTO: 4.22 10*6/MM3 (ref 3.77–5.28)
SODIUM SERPL-SCNC: 138 MMOL/L (ref 136–145)
TROPONIN T SERPL-MCNC: <0.01 NG/ML (ref 0–0.03)
WBC # BLD AUTO: 8.63 10*3/MM3 (ref 3.4–10.8)

## 2020-10-16 PROCEDURE — 80053 COMPREHEN METABOLIC PANEL: CPT | Performed by: NURSE PRACTITIONER

## 2020-10-16 PROCEDURE — 85730 THROMBOPLASTIN TIME PARTIAL: CPT | Performed by: NURSE PRACTITIONER

## 2020-10-16 PROCEDURE — 85025 COMPLETE CBC W/AUTO DIFF WBC: CPT | Performed by: NURSE PRACTITIONER

## 2020-10-16 PROCEDURE — 83880 ASSAY OF NATRIURETIC PEPTIDE: CPT | Performed by: NURSE PRACTITIONER

## 2020-10-16 PROCEDURE — 36415 COLL VENOUS BLD VENIPUNCTURE: CPT

## 2020-10-16 PROCEDURE — 85610 PROTHROMBIN TIME: CPT | Performed by: NURSE PRACTITIONER

## 2020-10-16 PROCEDURE — 99283 EMERGENCY DEPT VISIT LOW MDM: CPT

## 2020-10-16 PROCEDURE — 84484 ASSAY OF TROPONIN QUANT: CPT | Performed by: NURSE PRACTITIONER

## 2020-10-16 RX ORDER — SODIUM CHLORIDE 0.9 % (FLUSH) 0.9 %
10 SYRINGE (ML) INJECTION AS NEEDED
Status: DISCONTINUED | OUTPATIENT
Start: 2020-10-16 | End: 2020-10-16 | Stop reason: HOSPADM

## 2020-10-16 RX ORDER — ASPIRIN 81 MG/1
324 TABLET, CHEWABLE ORAL ONCE
Status: COMPLETED | OUTPATIENT
Start: 2020-10-16 | End: 2020-10-16

## 2020-10-16 RX ADMIN — ASPIRIN 324 MG: 81 TABLET, CHEWABLE ORAL at 20:31

## 2020-10-16 NOTE — ED PROVIDER NOTES
Subjective     Hypertension  Severity:  Moderate  Onset quality:  Gradual  Duration:  1 week  Timing:  Constant  Progression:  Improving  Chronicity:  Recurrent  Notable PTA blood pressures:  200s over 117  Context: stress    Relieved by:  Nothing  Worsened by:  Nothing  Ineffective treatments: prescribed cozaar, metoprolol, clonidine.  Associated symptoms comment:  Patient denies chest pain currently but reports episode of posterior chest wall pain and slight shortness of breath  Risk factors: cardiac disease and family hx of HTN        Review of Systems   Constitutional: Negative.    HENT: Negative.    Eyes: Negative.    Respiratory: Negative.    Cardiovascular: Negative.    Gastrointestinal: Negative.    Endocrine: Negative.    Genitourinary: Negative.    Musculoskeletal: Negative.    Skin: Negative.    Allergic/Immunologic: Negative.    Neurological: Negative.    Hematological: Negative.    Psychiatric/Behavioral: Negative.        Past Medical History:   Diagnosis Date   • Arthritis    • Asthma    • Diabetic peripheral neuropathy (CMS/HCC)    • Esophageal reflux    • Gout    • Hyperlipidemia    • Hypertension        Allergies   Allergen Reactions   • Meperidine Delirium and Hallucinations   • Morphine Delirium, Irritability and Hallucinations   • Adhesive Tape Itching, Dermatitis and Rash       Past Surgical History:   Procedure Laterality Date   • CARDIAC CATHETERIZATION N/A 3/2/2018    Procedure: Left Heart Cath;  Surgeon: Ryland Marina MD;  Location: [facility] INVASIVE LOCATION;  Service:    •  SECTION     • HYSTERECTOMY         Family History   Problem Relation Age of Onset   • Diabetes Other    • Hypertension Other        Social History     Socioeconomic History   • Marital status:      Spouse name: Not on file   • Number of children: Not on file   • Years of education: Not on file   • Highest education level: Not on file   Tobacco Use   • Smoking status: Former Smoker   • Smokeless  tobacco: Never Used   Substance and Sexual Activity   • Alcohol use: No   • Drug use: No   • Sexual activity: Defer           Objective   Physical Exam  Vitals signs and nursing note reviewed.   Constitutional:       Appearance: She is well-developed.   HENT:      Head: Normocephalic.      Right Ear: External ear normal.      Left Ear: External ear normal.   Eyes:      Conjunctiva/sclera: Conjunctivae normal.      Pupils: Pupils are equal, round, and reactive to light.   Neck:      Musculoskeletal: Normal range of motion and neck supple.   Cardiovascular:      Rate and Rhythm: Normal rate and regular rhythm.      Heart sounds: Normal heart sounds.   Pulmonary:      Effort: Pulmonary effort is normal.      Breath sounds: Normal breath sounds.   Abdominal:      General: Bowel sounds are normal.      Palpations: Abdomen is soft.   Musculoskeletal: Normal range of motion.   Skin:     General: Skin is warm and dry.      Capillary Refill: Capillary refill takes less than 2 seconds.   Neurological:      Mental Status: She is alert and oriented to person, place, and time.   Psychiatric:         Behavior: Behavior normal.         Thought Content: Thought content normal.         Procedures           ED Course                                           MDM    Final diagnoses:   Hypertension, unspecified type            Florencio Hemphill, APRN  10/16/20 2053

## 2020-10-16 NOTE — ED NOTES
Pt's  reports they were in an argument. Pt then decided to check her BP. Pt's  report that her BP was 176/113. Pt states she took an extra 25 mg of Metoprolol Extended Release. Upon assessment Pt's BP was 158/96.      Shama Denny, RN  10/16/20 0934

## 2020-10-17 NOTE — ED NOTES
Couldn't obtain EKG because of pt being in hallway bed. Provider aware.     Kyrie Mckeon  10/16/20 0355

## 2020-10-17 NOTE — DISCHARGE INSTRUCTIONS
Follow up with Dr. Kelly and your cardiologist.     Take medication as prescribed.     Return to the emergency room for worsening symptoms.

## 2021-02-08 ENCOUNTER — HOSPITAL ENCOUNTER (OUTPATIENT)
Dept: MAMMOGRAPHY | Facility: HOSPITAL | Age: 66
Discharge: HOME | End: 2021-02-08
Admitting: PHYSICIAN ASSISTANT

## 2021-02-08 DIAGNOSIS — Z12.31 VISIT FOR SCREENING MAMMOGRAM: ICD-10-CM

## 2021-02-08 PROCEDURE — 77063 BREAST TOMOSYNTHESIS BI: CPT

## 2021-02-08 PROCEDURE — 77067 SCR MAMMO BI INCL CAD: CPT

## 2021-02-22 DIAGNOSIS — Z23 IMMUNIZATION DUE: ICD-10-CM

## 2021-02-24 PROCEDURE — 77063 BREAST TOMOSYNTHESIS BI: CPT | Performed by: RADIOLOGY

## 2021-02-24 PROCEDURE — 77067 SCR MAMMO BI INCL CAD: CPT | Performed by: RADIOLOGY

## 2021-11-14 ENCOUNTER — APPOINTMENT (OUTPATIENT)
Dept: GENERAL RADIOLOGY | Facility: HOSPITAL | Age: 66
End: 2021-11-14

## 2021-11-14 ENCOUNTER — HOSPITAL ENCOUNTER (EMERGENCY)
Facility: HOSPITAL | Age: 66
Discharge: HOME | End: 2021-11-15
Attending: STUDENT IN AN ORGANIZED HEALTH CARE EDUCATION/TRAINING PROGRAM | Admitting: STUDENT IN AN ORGANIZED HEALTH CARE EDUCATION/TRAINING PROGRAM

## 2021-11-14 DIAGNOSIS — M70.51 PES ANSERINUS BURSITIS OF RIGHT KNEE: Primary | ICD-10-CM

## 2021-11-14 PROCEDURE — 73562 X-RAY EXAM OF KNEE 3: CPT

## 2021-11-14 PROCEDURE — 99283 EMERGENCY DEPT VISIT LOW MDM: CPT

## 2021-11-14 RX ORDER — HYDROCODONE BITARTRATE AND ACETAMINOPHEN 5; 325 MG/1; MG/1
1 TABLET ORAL ONCE
Status: COMPLETED | OUTPATIENT
Start: 2021-11-14 | End: 2021-11-14

## 2021-11-14 RX ORDER — KETOROLAC TROMETHAMINE 30 MG/ML
15 INJECTION, SOLUTION INTRAMUSCULAR; INTRAVENOUS ONCE
Status: DISCONTINUED | OUTPATIENT
Start: 2021-11-14 | End: 2021-11-14

## 2021-11-14 RX ORDER — METHYLPREDNISOLONE 4 MG/1
TABLET ORAL
Qty: 21 TABLET | Refills: 0 | Status: SHIPPED | OUTPATIENT
Start: 2021-11-14 | End: 2022-02-21

## 2021-11-14 RX ADMIN — HYDROCODONE BITARTRATE AND ACETAMINOPHEN 1 TABLET: 5; 325 TABLET ORAL at 22:45

## 2021-11-15 VITALS
RESPIRATION RATE: 20 BRPM | SYSTOLIC BLOOD PRESSURE: 144 MMHG | WEIGHT: 230 LBS | OXYGEN SATURATION: 95 % | BODY MASS INDEX: 36.96 KG/M2 | HEART RATE: 75 BPM | TEMPERATURE: 98.6 F | HEIGHT: 66 IN | DIASTOLIC BLOOD PRESSURE: 86 MMHG

## 2021-11-15 RX ORDER — HYDROCODONE BITARTRATE AND ACETAMINOPHEN 5; 325 MG/1; MG/1
1 TABLET ORAL ONCE
Status: COMPLETED | OUTPATIENT
Start: 2021-11-15 | End: 2021-11-15

## 2021-11-15 RX ORDER — HYDROCODONE BITARTRATE AND ACETAMINOPHEN 5; 325 MG/1; MG/1
1 TABLET ORAL ONCE
Status: DISCONTINUED | OUTPATIENT
Start: 2021-11-15 | End: 2021-11-15

## 2021-11-15 RX ADMIN — HYDROCODONE BITARTRATE AND ACETAMINOPHEN 1 TABLET: 5; 325 TABLET ORAL at 00:30

## 2021-11-15 NOTE — ED PROVIDER NOTES
Subjective   Patient is a 66-year-old female with diabetes, hypertension, hyperlipidemia, asthma, reflux, and kidney disease that presents to the ED with complaints of right knee pain.  She states she injured her knee back in September and has been seen by an orthopedic.  She states she has had an MRI which does show a meniscus tear however she is having pain along the medial distal knee.  She states that disc is getting progressively worse and radiating down the leg.  She states it is worse with weightbearing activities.  She has been taking Tylenol without significant relief.  She is unable to take NSAIDs secondary to her kidney dysfunction.  She denies chest pain, shortness of breath, fever, chills, nausea, vomiting, headache, dizziness, abdominal pain, or dysuria.      History provided by:  Patient   used: No        Review of Systems   Constitutional: Negative.  Negative for chills, diaphoresis, fatigue and fever.   HENT: Negative.  Negative for congestion, drooling, ear discharge, ear pain, facial swelling, postnasal drip, rhinorrhea, sinus pressure, sinus pain, sneezing, sore throat, tinnitus and trouble swallowing.    Eyes: Negative.  Negative for photophobia, pain, discharge, redness and itching.   Respiratory: Negative.  Negative for cough, shortness of breath and wheezing.    Cardiovascular: Negative.  Negative for chest pain.   Gastrointestinal: Negative.  Negative for abdominal distention, abdominal pain, constipation, diarrhea, nausea and vomiting.   Endocrine: Negative.    Genitourinary: Negative.  Negative for difficulty urinating, dysuria, flank pain, frequency and urgency.   Musculoskeletal: Positive for arthralgias. Negative for back pain, gait problem, joint swelling, myalgias, neck pain and neck stiffness.   Skin: Negative.  Negative for rash.   Neurological: Negative.  Negative for dizziness, seizures, syncope, facial asymmetry, speech difficulty, weakness, light-headedness,  numbness and headaches.   Psychiatric/Behavioral: Negative.  Negative for confusion.   All other systems reviewed and are negative.      Past Medical History:   Diagnosis Date   • Arthritis    • Asthma    • Diabetic peripheral neuropathy (CMS/HCC)    • Esophageal reflux    • Gout    • Hyperlipidemia    • Hypertension        Allergies   Allergen Reactions   • Meperidine Delirium and Hallucinations   • Morphine Delirium, Irritability and Hallucinations   • Adhesive Tape Itching, Dermatitis and Rash       Past Surgical History:   Procedure Laterality Date   • CARDIAC CATHETERIZATION N/A 3/2/2018    Procedure: Left Heart Cath;  Surgeon: Ryland Marina MD;  Location: [facility] CATH INVASIVE LOCATION;  Service:    •  SECTION     • HYSTERECTOMY         Family History   Problem Relation Age of Onset   • Diabetes Other    • Hypertension Other    • Breast cancer Neg Hx        Social History     Socioeconomic History   • Marital status:    Tobacco Use   • Smoking status: Former Smoker   • Smokeless tobacco: Never Used   Substance and Sexual Activity   • Alcohol use: No   • Drug use: No   • Sexual activity: Defer           Objective   Physical Exam  Vitals and nursing note reviewed.   Constitutional:       General: She is not in acute distress.     Appearance: She is well-developed. She is not diaphoretic.   HENT:      Head: Normocephalic and atraumatic.      Right Ear: External ear normal.      Left Ear: External ear normal.      Nose: Nose normal.      Mouth/Throat:      Mouth: Mucous membranes are moist.      Pharynx: Oropharynx is clear.   Eyes:      Extraocular Movements: Extraocular movements intact.      Conjunctiva/sclera: Conjunctivae normal.      Pupils: Pupils are equal, round, and reactive to light.   Neck:      Vascular: No JVD.      Trachea: No tracheal deviation.   Cardiovascular:      Rate and Rhythm: Normal rate and regular rhythm.      Pulses: Normal pulses.      Heart sounds: Normal heart  sounds. No murmur heard.      Pulmonary:      Effort: Pulmonary effort is normal. No respiratory distress.      Breath sounds: Normal breath sounds. No wheezing.   Abdominal:      General: Bowel sounds are normal. There is no distension.      Palpations: Abdomen is soft.      Tenderness: There is no abdominal tenderness. There is no guarding or rebound.   Musculoskeletal:         General: No deformity. Normal range of motion.      Cervical back: Normal range of motion and neck supple.      Right knee: No swelling, deformity, effusion, erythema, ecchymosis or lacerations. Tenderness present over the medial joint line. Normal pulse.      Right lower leg: No edema.      Left lower leg: No edema.   Skin:     General: Skin is warm and dry.      Coloration: Skin is not pale.      Findings: No erythema or rash.   Neurological:      General: No focal deficit present.      Mental Status: She is alert and oriented to person, place, and time.      Cranial Nerves: No cranial nerve deficit.   Psychiatric:         Mood and Affect: Mood normal.         Behavior: Behavior normal.         Thought Content: Thought content normal.         Procedures       Results for orders placed or performed during the hospital encounter of 10/16/20   Comprehensive Metabolic Panel    Specimen: Arm, Right; Blood   Result Value Ref Range    Glucose 108 (H) 65 - 99 mg/dL    BUN 18 8 - 23 mg/dL    Creatinine 0.97 0.57 - 1.00 mg/dL    Sodium 138 136 - 145 mmol/L    Potassium 4.2 3.5 - 5.2 mmol/L    Chloride 100 98 - 107 mmol/L    CO2 27.6 22.0 - 29.0 mmol/L    Calcium 9.9 8.6 - 10.5 mg/dL    Total Protein 7.9 6.0 - 8.5 g/dL    Albumin 4.56 3.50 - 5.20 g/dL    ALT (SGPT) 21 1 - 33 U/L    AST (SGOT) 20 1 - 32 U/L    Alkaline Phosphatase 74 39 - 117 U/L    Total Bilirubin 0.4 0.0 - 1.2 mg/dL    eGFR Non African Amer 58 (L) >60 mL/min/1.73    Globulin 3.3 gm/dL    A/G Ratio 1.4 g/dL    BUN/Creatinine Ratio 18.6 7.0 - 25.0    Anion Gap 10.4 5.0 - 15.0 mmol/L    Protime-INR    Specimen: Arm, Right; Blood   Result Value Ref Range    Protime 12.0 11.9 - 14.1 Seconds    INR 0.90 0.90 - 1.10   aPTT    Specimen: Arm, Right; Blood   Result Value Ref Range    PTT 31.6 25.6 - 35.3 seconds   BNP    Specimen: Arm, Right; Blood   Result Value Ref Range    proBNP 78.0 0.0 - 900.0 pg/mL   Troponin    Specimen: Arm, Right; Blood   Result Value Ref Range    Troponin T <0.010 0.000 - 0.030 ng/mL   CBC Auto Differential    Specimen: Arm, Right; Blood   Result Value Ref Range    WBC 8.63 3.40 - 10.80 10*3/mm3    RBC 4.22 3.77 - 5.28 10*6/mm3    Hemoglobin 12.0 12.0 - 15.9 g/dL    Hematocrit 38.9 34.0 - 46.6 %    MCV 92.2 79.0 - 97.0 fL    MCH 28.4 26.6 - 33.0 pg    MCHC 30.8 (L) 31.5 - 35.7 g/dL    RDW 13.0 12.3 - 15.4 %    RDW-SD 43.8 37.0 - 54.0 fl    MPV 10.3 6.0 - 12.0 fL    Platelets 306 140 - 450 10*3/mm3    Neutrophil % 61.9 42.7 - 76.0 %    Lymphocyte % 27.9 19.6 - 45.3 %    Monocyte % 7.4 5.0 - 12.0 %    Eosinophil % 2.0 0.3 - 6.2 %    Basophil % 0.5 0.0 - 1.5 %    Immature Grans % 0.3 0.0 - 0.5 %    Neutrophils, Absolute 5.34 1.70 - 7.00 10*3/mm3    Lymphocytes, Absolute 2.41 0.70 - 3.10 10*3/mm3    Monocytes, Absolute 0.64 0.10 - 0.90 10*3/mm3    Eosinophils, Absolute 0.17 0.00 - 0.40 10*3/mm3    Basophils, Absolute 0.04 0.00 - 0.20 10*3/mm3    Immature Grans, Absolute 0.03 0.00 - 0.05 10*3/mm3    nRBC 0.0 0.0 - 0.2 /100 WBC       ED Course  ED Course as of 11/14/21 2327   Sun Nov 14, 2021 2256 Endorsed to Estefany Jackson NP [MH]   0741 XR Knee 3 View Right  IMPRESSION:  Osteoarthritis. No acute findings.     Signer Name: Zi Stone MD   Signed: 11/14/2021 11:24 PM   Workstation Name: [email or web address]    [facility] []      ED Course User Index  [] Indigo Garrett PA-C  [] Estefany Jackson APRN                                           [facility]    Final diagnoses:   Pes anserinus bursitis of right knee       ED Disposition  ED Disposition      ED Disposition Condition Comment    Discharge Stable           Kristofer Soria, DO  [address] [place] [address] [phone]    Schedule an appointment as soon as possible for a visit in 1 day           Medication List      New Prescriptions    methylPREDNISolone 4 MG dose pack  Commonly known as: MEDROL  Take as directed on package instructions.           Where to Get Your Medications      You can get these medications from any pharmacy    Bring a paper prescription for each of these medications  · methylPREDNISolone 4 MG dose pack          Estefany Jackson, APRN  11/14/21 2366

## 2021-11-15 NOTE — ED NOTES
Patient advises that jose was diagnosed with a meniscus tear a few weeks ago by orthopedic in [place]. States they are not able to do surgery until Dec 2nd and the pain is unbearable.      Antoinette Mahoney RN  11/14/21 4654

## 2021-12-28 ENCOUNTER — LAB (OUTPATIENT)
Dept: LAB | Facility: HOSPITAL | Age: 66
End: 2021-12-28

## 2021-12-28 ENCOUNTER — TRANSCRIBE ORDERS (OUTPATIENT)
Dept: INTERVENTIONAL RADIOLOGY/VASCULAR | Facility: HOSPITAL | Age: 66
End: 2021-12-28

## 2021-12-28 DIAGNOSIS — E55.9 VITAMIN D DEFICIENCY: ICD-10-CM

## 2021-12-28 DIAGNOSIS — N18.30 STAGE 3 CHRONIC KIDNEY DISEASE, UNSPECIFIED WHETHER STAGE 3A OR 3B CKD: Primary | ICD-10-CM

## 2021-12-28 DIAGNOSIS — N18.30 STAGE 3 CHRONIC KIDNEY DISEASE, UNSPECIFIED WHETHER STAGE 3A OR 3B CKD: ICD-10-CM

## 2021-12-28 PROCEDURE — 36415 COLL VENOUS BLD VENIPUNCTURE: CPT

## 2021-12-28 PROCEDURE — 82306 VITAMIN D 25 HYDROXY: CPT

## 2021-12-28 PROCEDURE — 85025 COMPLETE CBC W/AUTO DIFF WBC: CPT

## 2021-12-28 PROCEDURE — 86256 FLUORESCENT ANTIBODY TITER: CPT

## 2021-12-28 PROCEDURE — 80069 RENAL FUNCTION PANEL: CPT

## 2021-12-28 PROCEDURE — 83520 IMMUNOASSAY QUANT NOS NONAB: CPT

## 2021-12-28 PROCEDURE — 82570 ASSAY OF URINE CREATININE: CPT

## 2021-12-28 PROCEDURE — 84156 ASSAY OF PROTEIN URINE: CPT

## 2021-12-29 LAB
25(OH)D3 SERPL-MCNC: 45.6 NG/ML
ALBUMIN SERPL-MCNC: 4.3 G/DL (ref 3.5–5.2)
ANION GAP SERPL CALCULATED.3IONS-SCNC: 10.3 MMOL/L (ref 5–15)
BASOPHILS # BLD AUTO: 0.05 10*3/MM3 (ref 0–0.2)
BASOPHILS NFR BLD AUTO: 0.7 % (ref 0–1.5)
BUN SERPL-MCNC: 18 MG/DL (ref 8–23)
BUN/CREAT SERPL: 14.6 (ref 7–25)
CALCIUM SPEC-SCNC: 9.8 MG/DL (ref 8.6–10.5)
CHLORIDE SERPL-SCNC: 104 MMOL/L (ref 98–107)
CO2 SERPL-SCNC: 27.7 MMOL/L (ref 22–29)
CREAT SERPL-MCNC: 1.23 MG/DL (ref 0.57–1)
CREAT UR-MCNC: 58.3 MG/DL
DEPRECATED RDW RBC AUTO: 41.5 FL (ref 37–54)
EOSINOPHIL # BLD AUTO: 0.1 10*3/MM3 (ref 0–0.4)
EOSINOPHIL NFR BLD AUTO: 1.3 % (ref 0.3–6.2)
ERYTHROCYTE [DISTWIDTH] IN BLOOD BY AUTOMATED COUNT: 12.7 % (ref 12.3–15.4)
GFR SERPL CREATININE-BSD FRML MDRD: 44 ML/MIN/1.73
GLUCOSE SERPL-MCNC: 123 MG/DL (ref 65–99)
HCT VFR BLD AUTO: 36.1 % (ref 34–46.6)
HGB BLD-MCNC: 11.7 G/DL (ref 12–15.9)
IMM GRANULOCYTES # BLD AUTO: 0.04 10*3/MM3 (ref 0–0.05)
IMM GRANULOCYTES NFR BLD AUTO: 0.5 % (ref 0–0.5)
LYMPHOCYTES # BLD AUTO: 1.88 10*3/MM3 (ref 0.7–3.1)
LYMPHOCYTES NFR BLD AUTO: 24.9 % (ref 19.6–45.3)
MCH RBC QN AUTO: 29.2 PG (ref 26.6–33)
MCHC RBC AUTO-ENTMCNC: 32.4 G/DL (ref 31.5–35.7)
MCV RBC AUTO: 90 FL (ref 79–97)
MONOCYTES # BLD AUTO: 0.57 10*3/MM3 (ref 0.1–0.9)
MONOCYTES NFR BLD AUTO: 7.5 % (ref 5–12)
NEUTROPHILS NFR BLD AUTO: 4.91 10*3/MM3 (ref 1.7–7)
NEUTROPHILS NFR BLD AUTO: 65.1 % (ref 42.7–76)
NRBC BLD AUTO-RTO: 0 /100 WBC (ref 0–0.2)
PHOSPHATE SERPL-MCNC: 2.7 MG/DL (ref 2.5–4.5)
PLATELET # BLD AUTO: 319 10*3/MM3 (ref 140–450)
PMV BLD AUTO: 11 FL (ref 6–12)
POTASSIUM SERPL-SCNC: 4.4 MMOL/L (ref 3.5–5.2)
PROT ?TM UR-MCNC: 56 MG/DL
RBC # BLD AUTO: 4.01 10*6/MM3 (ref 3.77–5.28)
SODIUM SERPL-SCNC: 142 MMOL/L (ref 136–145)
WBC NRBC COR # BLD: 7.55 10*3/MM3 (ref 3.4–10.8)

## 2021-12-31 LAB
C-ANCA TITR SER IF: NORMAL TITER
MYELOPEROXIDASE AB SER IA-ACNC: <9 U/ML (ref 0–9)
P-ANCA ATYPICAL TITR SER IF: NORMAL TITER
P-ANCA TITR SER IF: NORMAL TITER
PROTEINASE3 AB SER IA-ACNC: <3.5 U/ML (ref 0–3.5)

## 2022-01-09 ENCOUNTER — HOSPITAL ENCOUNTER (EMERGENCY)
Facility: HOSPITAL | Age: 67
Discharge: HOME | End: 2022-01-09
Admitting: EMERGENCY MEDICINE

## 2022-01-09 ENCOUNTER — APPOINTMENT (OUTPATIENT)
Dept: GENERAL RADIOLOGY | Facility: HOSPITAL | Age: 67
End: 2022-01-09

## 2022-01-09 VITALS
BODY MASS INDEX: 36.64 KG/M2 | WEIGHT: 228 LBS | RESPIRATION RATE: 20 BRPM | HEART RATE: 92 BPM | OXYGEN SATURATION: 92 % | TEMPERATURE: 98 F | SYSTOLIC BLOOD PRESSURE: 169 MMHG | HEIGHT: 66 IN | DIASTOLIC BLOOD PRESSURE: 82 MMHG

## 2022-01-09 DIAGNOSIS — U07.1 COVID-19: Primary | ICD-10-CM

## 2022-01-09 DIAGNOSIS — I10 PRIMARY HYPERTENSION: ICD-10-CM

## 2022-01-09 LAB
A-A DO2: 27.6 MMHG (ref 0–300)
ALBUMIN SERPL-MCNC: 4.16 G/DL (ref 3.5–5.2)
ALBUMIN/GLOB SERPL: 1.2 G/DL
ALP SERPL-CCNC: 83 U/L (ref 39–117)
ALT SERPL W P-5'-P-CCNC: 36 U/L (ref 1–33)
ANION GAP SERPL CALCULATED.3IONS-SCNC: 13.1 MMOL/L (ref 5–15)
APTT PPP: 31.6 SECONDS (ref 25.5–35.4)
ARTERIAL PATENCY WRIST A: ABNORMAL
AST SERPL-CCNC: 33 U/L (ref 1–32)
ATMOSPHERIC PRESS: 732 MMHG
B PARAPERT DNA SPEC QL NAA+PROBE: NOT DETECTED
B PERT DNA SPEC QL NAA+PROBE: NOT DETECTED
BACTERIA UR QL AUTO: NORMAL /HPF
BASE EXCESS BLDA CALC-SCNC: 2.2 MMOL/L (ref 0–2)
BASOPHILS # BLD AUTO: 0.02 10*3/MM3 (ref 0–0.2)
BASOPHILS NFR BLD AUTO: 0.3 % (ref 0–1.5)
BDY SITE: ABNORMAL
BILIRUB SERPL-MCNC: 0.2 MG/DL (ref 0–1.2)
BILIRUB UR QL STRIP: NEGATIVE
BODY TEMPERATURE: 0 C
BUN SERPL-MCNC: 17 MG/DL (ref 8–23)
BUN/CREAT SERPL: 13.8 (ref 7–25)
C PNEUM DNA NPH QL NAA+NON-PROBE: NOT DETECTED
CALCIUM SPEC-SCNC: 9.5 MG/DL (ref 8.6–10.5)
CHLORIDE SERPL-SCNC: 99 MMOL/L (ref 98–107)
CLARITY UR: CLEAR
CO2 BLDA-SCNC: 25 MMOL/L (ref 22–33)
CO2 SERPL-SCNC: 22.9 MMOL/L (ref 22–29)
COHGB MFR BLD: 1.4 % (ref 0–5)
COLOR UR: YELLOW
CREAT SERPL-MCNC: 1.23 MG/DL (ref 0.57–1)
CRP SERPL-MCNC: 0.72 MG/DL (ref 0–0.5)
D-LACTATE SERPL-SCNC: 0.8 MMOL/L (ref 0.5–2)
DEPRECATED RDW RBC AUTO: 45.1 FL (ref 37–54)
EOSINOPHIL # BLD AUTO: 0.08 10*3/MM3 (ref 0–0.4)
EOSINOPHIL NFR BLD AUTO: 1.1 % (ref 0.3–6.2)
ERYTHROCYTE [DISTWIDTH] IN BLOOD BY AUTOMATED COUNT: 13.2 % (ref 12.3–15.4)
FLUAV SUBTYP SPEC NAA+PROBE: NOT DETECTED
FLUBV RNA ISLT QL NAA+PROBE: NOT DETECTED
GFR SERPL CREATININE-BSD FRML MDRD: 44 ML/MIN/1.73
GLOBULIN UR ELPH-MCNC: 3.3 GM/DL
GLUCOSE SERPL-MCNC: 105 MG/DL (ref 65–99)
GLUCOSE UR STRIP-MCNC: NEGATIVE MG/DL
HADV DNA SPEC NAA+PROBE: NOT DETECTED
HCO3 BLDA-SCNC: 24.1 MMOL/L (ref 20–26)
HCOV 229E RNA SPEC QL NAA+PROBE: NOT DETECTED
HCOV HKU1 RNA SPEC QL NAA+PROBE: NOT DETECTED
HCOV NL63 RNA SPEC QL NAA+PROBE: NOT DETECTED
HCOV OC43 RNA SPEC QL NAA+PROBE: NOT DETECTED
HCT VFR BLD AUTO: 39 % (ref 34–46.6)
HCT VFR BLD CALC: 36.4 % (ref 38–51)
HGB BLD-MCNC: 12.1 G/DL (ref 12–15.9)
HGB BLDA-MCNC: 11.9 G/DL (ref 13.5–17.5)
HGB UR QL STRIP.AUTO: NEGATIVE
HMPV RNA NPH QL NAA+NON-PROBE: NOT DETECTED
HOLD SPECIMEN: NORMAL
HOLD SPECIMEN: NORMAL
HPIV1 RNA ISLT QL NAA+PROBE: NOT DETECTED
HPIV2 RNA SPEC QL NAA+PROBE: NOT DETECTED
HPIV3 RNA NPH QL NAA+PROBE: NOT DETECTED
HPIV4 P GENE NPH QL NAA+PROBE: NOT DETECTED
HYALINE CASTS UR QL AUTO: NORMAL /LPF
IMM GRANULOCYTES # BLD AUTO: 0.03 10*3/MM3 (ref 0–0.05)
IMM GRANULOCYTES NFR BLD AUTO: 0.4 % (ref 0–0.5)
INHALED O2 CONCENTRATION: 21 %
INR PPP: 0.84 (ref 0.9–1.1)
KETONES UR QL STRIP: NEGATIVE
LEUKOCYTE ESTERASE UR QL STRIP.AUTO: NEGATIVE
LYMPHOCYTES # BLD AUTO: 1.97 10*3/MM3 (ref 0.7–3.1)
LYMPHOCYTES NFR BLD AUTO: 28.1 % (ref 19.6–45.3)
Lab: ABNORMAL
M PNEUMO IGG SER IA-ACNC: NOT DETECTED
MAGNESIUM SERPL-MCNC: 1.8 MG/DL (ref 1.6–2.4)
MCH RBC QN AUTO: 28.6 PG (ref 26.6–33)
MCHC RBC AUTO-ENTMCNC: 31 G/DL (ref 31.5–35.7)
MCV RBC AUTO: 92.2 FL (ref 79–97)
METHGB BLD QL: 0.2 % (ref 0–3)
MODALITY: ABNORMAL
MONOCYTES # BLD AUTO: 0.74 10*3/MM3 (ref 0.1–0.9)
MONOCYTES NFR BLD AUTO: 10.6 % (ref 5–12)
NEUTROPHILS NFR BLD AUTO: 4.16 10*3/MM3 (ref 1.7–7)
NEUTROPHILS NFR BLD AUTO: 59.5 % (ref 42.7–76)
NITRITE UR QL STRIP: NEGATIVE
NOTE: ABNORMAL
NOTIFIED BY: ABNORMAL
NOTIFIED WHO: ABNORMAL
NRBC BLD AUTO-RTO: 0 /100 WBC (ref 0–0.2)
NT-PROBNP SERPL-MCNC: 46.4 PG/ML (ref 0–900)
OXYHGB MFR BLDV: 96.6 % (ref 94–99)
PCO2 BLDA: 28.4 MM HG (ref 35–45)
PCO2 TEMP ADJ BLD: ABNORMAL MM[HG]
PH BLDA: 7.54 PH UNITS (ref 7.35–7.45)
PH UR STRIP.AUTO: 6 [PH] (ref 5–8)
PH, TEMP CORRECTED: ABNORMAL
PLATELET # BLD AUTO: 287 10*3/MM3 (ref 140–450)
PMV BLD AUTO: 9.9 FL (ref 6–12)
PO2 BLDA: 84.3 MM HG (ref 83–108)
PO2 TEMP ADJ BLD: ABNORMAL MM[HG]
POTASSIUM SERPL-SCNC: 4 MMOL/L (ref 3.5–5.2)
PROT SERPL-MCNC: 7.5 G/DL (ref 6–8.5)
PROT UR QL STRIP: ABNORMAL
PROTHROMBIN TIME: 11.9 SECONDS (ref 12.8–14.5)
RBC # BLD AUTO: 4.23 10*6/MM3 (ref 3.77–5.28)
RBC # UR STRIP: NORMAL /HPF
REF LAB TEST METHOD: NORMAL
RHINOVIRUS RNA SPEC NAA+PROBE: NOT DETECTED
RSV RNA NPH QL NAA+NON-PROBE: NOT DETECTED
SAO2 % BLDCOA: 98.2 % (ref 94–99)
SARS-COV-2 RNA NPH QL NAA+NON-PROBE: DETECTED
SODIUM SERPL-SCNC: 135 MMOL/L (ref 136–145)
SP GR UR STRIP: <=1.005 (ref 1–1.03)
SQUAMOUS #/AREA URNS HPF: NORMAL /HPF
TROPONIN T SERPL-MCNC: <0.01 NG/ML (ref 0–0.03)
TROPONIN T SERPL-MCNC: <0.01 NG/ML (ref 0–0.03)
UROBILINOGEN UR QL STRIP: ABNORMAL
VENTILATOR MODE: ABNORMAL
WBC # UR STRIP: NORMAL /HPF
WBC NRBC COR # BLD: 7 10*3/MM3 (ref 3.4–10.8)
WHOLE BLOOD HOLD SPECIMEN: NORMAL
WHOLE BLOOD HOLD SPECIMEN: NORMAL

## 2022-01-09 PROCEDURE — 81001 URINALYSIS AUTO W/SCOPE: CPT | Performed by: PHYSICIAN ASSISTANT

## 2022-01-09 PROCEDURE — 71045 X-RAY EXAM CHEST 1 VIEW: CPT

## 2022-01-09 PROCEDURE — 25010000002 SOTROVIMAB 500 MG/8ML SOLUTION: Performed by: PHYSICIAN ASSISTANT

## 2022-01-09 PROCEDURE — 80053 COMPREHEN METABOLIC PANEL: CPT | Performed by: PHYSICIAN ASSISTANT

## 2022-01-09 PROCEDURE — 85610 PROTHROMBIN TIME: CPT | Performed by: PHYSICIAN ASSISTANT

## 2022-01-09 PROCEDURE — 0202U NFCT DS 22 TRGT SARS-COV-2: CPT | Performed by: PHYSICIAN ASSISTANT

## 2022-01-09 PROCEDURE — 85730 THROMBOPLASTIN TIME PARTIAL: CPT | Performed by: PHYSICIAN ASSISTANT

## 2022-01-09 PROCEDURE — 83605 ASSAY OF LACTIC ACID: CPT | Performed by: PHYSICIAN ASSISTANT

## 2022-01-09 PROCEDURE — 94640 AIRWAY INHALATION TREATMENT: CPT

## 2022-01-09 PROCEDURE — 85025 COMPLETE CBC W/AUTO DIFF WBC: CPT | Performed by: PHYSICIAN ASSISTANT

## 2022-01-09 PROCEDURE — 83735 ASSAY OF MAGNESIUM: CPT | Performed by: PHYSICIAN ASSISTANT

## 2022-01-09 PROCEDURE — 82805 BLOOD GASES W/O2 SATURATION: CPT

## 2022-01-09 PROCEDURE — 93010 ELECTROCARDIOGRAM REPORT: CPT | Performed by: INTERNAL MEDICINE

## 2022-01-09 PROCEDURE — 36600 WITHDRAWAL OF ARTERIAL BLOOD: CPT

## 2022-01-09 PROCEDURE — 25010000002 DEXAMETHASONE PER 1 MG: Performed by: STUDENT IN AN ORGANIZED HEALTH CARE EDUCATION/TRAINING PROGRAM

## 2022-01-09 PROCEDURE — 93005 ELECTROCARDIOGRAM TRACING: CPT | Performed by: EMERGENCY MEDICINE

## 2022-01-09 PROCEDURE — 87040 BLOOD CULTURE FOR BACTERIA: CPT | Performed by: PHYSICIAN ASSISTANT

## 2022-01-09 PROCEDURE — 99284 EMERGENCY DEPT VISIT MOD MDM: CPT

## 2022-01-09 PROCEDURE — 36415 COLL VENOUS BLD VENIPUNCTURE: CPT

## 2022-01-09 PROCEDURE — 82375 ASSAY CARBOXYHB QUANT: CPT

## 2022-01-09 PROCEDURE — 84484 ASSAY OF TROPONIN QUANT: CPT | Performed by: PHYSICIAN ASSISTANT

## 2022-01-09 PROCEDURE — M0247 HC INTRAVENOUS INFUSION SOTROVIMAB: HCPCS | Performed by: PHYSICIAN ASSISTANT

## 2022-01-09 PROCEDURE — 86140 C-REACTIVE PROTEIN: CPT | Performed by: PHYSICIAN ASSISTANT

## 2022-01-09 PROCEDURE — 96374 THER/PROPH/DIAG INJ IV PUSH: CPT

## 2022-01-09 PROCEDURE — 83880 ASSAY OF NATRIURETIC PEPTIDE: CPT | Performed by: PHYSICIAN ASSISTANT

## 2022-01-09 PROCEDURE — 83050 HGB METHEMOGLOBIN QUAN: CPT

## 2022-01-09 RX ORDER — DIPHENHYDRAMINE HCL 50 MG
50 CAPSULE ORAL ONCE AS NEEDED
Status: DISCONTINUED | OUTPATIENT
Start: 2022-01-09 | End: 2022-01-10 | Stop reason: HOSPADM

## 2022-01-09 RX ORDER — HYDRALAZINE HYDROCHLORIDE 25 MG/1
75 TABLET, FILM COATED ORAL ONCE
Status: COMPLETED | OUTPATIENT
Start: 2022-01-09 | End: 2022-01-09

## 2022-01-09 RX ORDER — ALBUTEROL SULFATE 90 UG/1
2 AEROSOL, METERED RESPIRATORY (INHALATION) EVERY 4 HOURS PRN
Qty: 18 G | Refills: 0 | Status: SHIPPED | OUTPATIENT
Start: 2022-01-09

## 2022-01-09 RX ORDER — DIPHENHYDRAMINE HYDROCHLORIDE 50 MG/ML
50 INJECTION INTRAMUSCULAR; INTRAVENOUS ONCE AS NEEDED
Status: DISCONTINUED | OUTPATIENT
Start: 2022-01-09 | End: 2022-01-10 | Stop reason: HOSPADM

## 2022-01-09 RX ORDER — ALBUTEROL SULFATE 90 UG/1
2 AEROSOL, METERED RESPIRATORY (INHALATION) ONCE
Status: COMPLETED | OUTPATIENT
Start: 2022-01-09 | End: 2022-01-09

## 2022-01-09 RX ORDER — SODIUM CHLORIDE 0.9 % (FLUSH) 0.9 %
10 SYRINGE (ML) INJECTION AS NEEDED
Status: DISCONTINUED | OUTPATIENT
Start: 2022-01-09 | End: 2022-01-10 | Stop reason: HOSPADM

## 2022-01-09 RX ORDER — DEXAMETHASONE SODIUM PHOSPHATE 4 MG/ML
4 INJECTION, SOLUTION INTRA-ARTICULAR; INTRALESIONAL; INTRAMUSCULAR; INTRAVENOUS; SOFT TISSUE ONCE
Status: COMPLETED | OUTPATIENT
Start: 2022-01-09 | End: 2022-01-09

## 2022-01-09 RX ORDER — METHYLPREDNISOLONE SODIUM SUCCINATE 125 MG/2ML
125 INJECTION, POWDER, LYOPHILIZED, FOR SOLUTION INTRAMUSCULAR; INTRAVENOUS ONCE AS NEEDED
Status: DISCONTINUED | OUTPATIENT
Start: 2022-01-09 | End: 2022-01-10 | Stop reason: HOSPADM

## 2022-01-09 RX ORDER — EPINEPHRINE 1 MG/ML
0.3 INJECTION, SOLUTION INTRAMUSCULAR; SUBCUTANEOUS ONCE AS NEEDED
Status: DISCONTINUED | OUTPATIENT
Start: 2022-01-09 | End: 2022-01-10 | Stop reason: HOSPADM

## 2022-01-09 RX ORDER — DEXAMETHASONE SODIUM PHOSPHATE 4 MG/ML
6 INJECTION, SOLUTION INTRA-ARTICULAR; INTRALESIONAL; INTRAMUSCULAR; INTRAVENOUS; SOFT TISSUE ONCE
Status: DISCONTINUED | OUTPATIENT
Start: 2022-01-09 | End: 2022-01-09

## 2022-01-09 RX ORDER — SODIUM CHLORIDE 9 MG/ML
30 INJECTION, SOLUTION INTRAVENOUS ONCE
Status: COMPLETED | OUTPATIENT
Start: 2022-01-09 | End: 2022-01-09

## 2022-01-09 RX ORDER — CLONIDINE HYDROCHLORIDE 0.1 MG/1
0.1 TABLET ORAL ONCE
Status: COMPLETED | OUTPATIENT
Start: 2022-01-09 | End: 2022-01-09

## 2022-01-09 RX ADMIN — HYDRALAZINE HYDROCHLORIDE 75 MG: 25 TABLET, FILM COATED ORAL at 23:21

## 2022-01-09 RX ADMIN — DEXAMETHASONE SODIUM PHOSPHATE 4 MG: 4 INJECTION, SOLUTION INTRA-ARTICULAR; INTRALESIONAL; INTRAMUSCULAR; INTRAVENOUS; SOFT TISSUE at 21:39

## 2022-01-09 RX ADMIN — SODIUM CHLORIDE 30 ML: 9 INJECTION, SOLUTION INTRAVENOUS at 23:03

## 2022-01-09 RX ADMIN — SODIUM CHLORIDE 500 MG: 9 INJECTION, SOLUTION INTRAVENOUS at 22:21

## 2022-01-09 RX ADMIN — CLONIDINE HYDROCHLORIDE 0.1 MG: 0.1 TABLET ORAL at 23:20

## 2022-01-09 RX ADMIN — ALBUTEROL SULFATE 2 PUFF: 90 AEROSOL, METERED RESPIRATORY (INHALATION) at 21:39

## 2022-01-09 NOTE — ED NOTES
MEDICAL SCREENING:    Reason for Visit: weak, sob, cough, elevated heart rate x 3 days    Patient initially seen in triage.  The patient was advised further evaluation and diagnostic testing will be needed, some of the treatment and testing will be initiated in the lobby in order to begin the process.  The patient will be returned to the waiting area for the time being and possibly be re-assessed by a subsequent ED provider.  The patient will be brought back to the treatment area in as timely manner as possible.       Nayeli Meyer, PA  01/09/22 0619

## 2022-01-10 LAB
QT INTERVAL: 362 MS
QTC INTERVAL: 462 MS

## 2022-01-10 NOTE — ED NOTES
Pts sotrovimab infusion complete. No adverse reactions noted or voiced by pt. VSS, RR even and unlabored. Will monitor for the 1 hour observation period.      Jada Meyer RN  01/09/22 4163

## 2022-01-10 NOTE — ED NOTES
The FDA has issued an Emergency Use Authorization (EUA) to permit emergency use of the unapproved product sotrovimab for treatment of laboratory confirmed COVID-19 in certain ambulatory patients. There is no prescribing information or package insert, however, the FDA has authorized distribution of Fact Sheets for patients and/or caregivers for additional information on this investigational therapy. I have provided the Fact Sheet to and discussed the following with the pt and he/she agreed to proceed:  • FDA has authorized the emergency use (EUA) of sotrovimab , which is not an FDA approved drug.  • The pt has the option to accept or refuse bamlanivimab at any time.  • The significant known and potential risks and benefits of sotrovimab and the extent to which such risks and benefits are unknown.  • Information on available alternative treatments and the risks and benefits of those alternatives have been shared.       Jada Meyer, ARCHANA  01/09/22 6796

## 2022-01-10 NOTE — ED PROVIDER NOTES
Subjective   66-year-old female with past medical history of diabetes, end-stage kidney disease, hypertension, hyperlipidemia, gout, GERD, asthma, arthritis, and diabetic peripheral neuropathy presents to the emergency room with generalized weakness, shortness of breath, high heart rate for the past 4 days.  Denies fever, chills, chest pain, dysuria, back pain, sick contacts, or travel.  Patient is fully vaccinated against COVID.  She does state that with minimal exertion she gets very winded but if she sits still she feels fine.  Denies any other complaints or concerns at this time.      History provided by:  Patient   used: No        Review of Systems   Constitutional: Positive for fatigue. Negative for fever.   HENT: Negative.    Respiratory: Positive for shortness of breath.    Cardiovascular: Negative.  Negative for chest pain.   Gastrointestinal: Negative.  Negative for abdominal pain.   Endocrine: Negative.    Genitourinary: Negative.  Negative for dysuria.   Skin: Negative.    Neurological: Negative.    Psychiatric/Behavioral: Negative.    All other systems reviewed and are negative.      Past Medical History:   Diagnosis Date   • Arthritis    • Asthma    • Diabetic peripheral neuropathy (CMS/HCC)    • Esophageal reflux    • Gout    • Hyperlipidemia    • Hypertension        Allergies   Allergen Reactions   • Meperidine Delirium and Hallucinations   • Morphine Delirium, Irritability and Hallucinations   • Adhesive Tape Itching, Dermatitis and Rash       Past Surgical History:   Procedure Laterality Date   • CARDIAC CATHETERIZATION N/A 3/2/2018    Procedure: Left Heart Cath;  Surgeon: Ryland Marina MD;  Location: [facility] INVASIVE LOCATION;  Service:    •  SECTION     • HYSTERECTOMY         Family History   Problem Relation Age of Onset   • Diabetes Other    • Hypertension Other    • Breast cancer Neg Hx        Social History     Socioeconomic History   • Marital status:     Tobacco Use   • Smoking status: Former Smoker   • Smokeless tobacco: Never Used   Substance and Sexual Activity   • Alcohol use: No   • Drug use: No   • Sexual activity: Defer           Objective   Physical Exam  Vitals and nursing note reviewed.   Constitutional:       General: She is not in acute distress.     Appearance: She is well-developed. She is not diaphoretic.   HENT:      Head: Normocephalic and atraumatic.      Right Ear: External ear normal.      Left Ear: External ear normal.      Nose: Nose normal.   Eyes:      Conjunctiva/sclera: Conjunctivae normal.      Pupils: Pupils are equal, round, and reactive to light.   Neck:      Vascular: No JVD.      Trachea: No tracheal deviation.   Cardiovascular:      Rate and Rhythm: Normal rate and regular rhythm.      Heart sounds: Normal heart sounds. No murmur heard.      Pulmonary:      Effort: Pulmonary effort is normal. No respiratory distress.      Breath sounds: Normal breath sounds. No wheezing.   Abdominal:      General: Bowel sounds are normal.      Palpations: Abdomen is soft.      Tenderness: There is no abdominal tenderness.   Musculoskeletal:         General: No deformity. Normal range of motion.      Cervical back: Normal range of motion and neck supple.   Skin:     General: Skin is warm and dry.      Coloration: Skin is not pale.      Findings: No erythema or rash.   Neurological:      Mental Status: She is alert and oriented to person, place, and time.      Cranial Nerves: No cranial nerve deficit.   Psychiatric:         Behavior: Behavior normal.         Thought Content: Thought content normal.         Procedures           ED Course  ED Course as of 01/09/22 2309   Sun Jan 09, 2022   1813 EKG at 1554 shows sinus rhythm, rate 98.  ID interval 152, QRS duration 78, QTc 460 ms.  Some baseline artifact.  Nonspecific ST-T abnormality.  No evidence for STEMI. [CM]   2052 XR Chest 1 View [TK]   2054 Creatinine(!): 1.23  Stable [TK]   2100 O2  Saturation, Arterial: 98.2 [TK]   2104 COVID19(!!): Detected [TK]   2304 Pt tells me she cannot take oral steroids and requests not to be prescribed any at this time. She was given strict return precautions and has verbally acknowledged understanding. [TK]   2307 The FDA has issued an Emergency Use Authorization (EUA) to permit emergency use of the unapproved product sotrovimab for treatment of laboratory confirmed COVID-19 in certain ambulatory patients. There is no prescribing information or package insert, however, the FDA has authorized distribution of Fact Sheets for patients and/or caregivers for additional information on this investigational therapy. I have provided the Fact Sheet to and discussed the following with the patient and he/she agreed to proceed:  FDA has authorized the emergency use (EUA) of sotrovimab, which is not an FDA approved drug.  The patient has the option to accept or refuse sotrovimab at any time.  The significant known and potential risks and benefits of sotrovimab and the extent to which such risks and benefits are unknown.  Information on available alternative treatments and the risks and benefits of those alternatives have been shared.     [TK]      ED Course User Index  [CM] Mitul Hunt MD  [TK] Ana Lou PA-C                                                 MDM  Number of Diagnoses or Management Options  COVID-19: new and requires workup  Primary hypertension: new and does not require workup     Amount and/or Complexity of Data Reviewed  Clinical lab tests: reviewed and ordered  Tests in the radiology section of CPT®: reviewed and ordered  Tests in the medicine section of CPT®: reviewed and ordered    Risk of Complications, Morbidity, and/or Mortality  Presenting problems: moderate  Diagnostic procedures: moderate  Management options: moderate        Final diagnoses:   COVID-19   Primary hypertension       ED Disposition  ED Disposition     ED Disposition  Condition Comment    Discharge Stable           Jonathan Kelly MD  [address] [facility] [address]  [phone]    In 2 days           Medication List      New Prescriptions    albuterol sulfate  (90 Base) MCG/ACT inhaler  Commonly known as: Ventolin HFA  Inhale 2 puffs Every 4 (Four) Hours As Needed for Wheezing.           Where to Get Your Medications      These medications were sent to [facility], [place] - [address] [place] [address] [phone] [place] [phone] [address] [address]    Phone: [phone]   · albuterol sulfate  (90 Base) MCG/ACT inhaler          Ana Lou PABlaineC  01/09/22 4618

## 2022-01-14 LAB
BACTERIA SPEC AEROBE CULT: NORMAL
BACTERIA SPEC AEROBE CULT: NORMAL

## 2022-01-24 ENCOUNTER — LAB (OUTPATIENT)
Dept: LAB | Facility: HOSPITAL | Age: 67
End: 2022-01-24

## 2022-01-24 ENCOUNTER — TRANSCRIBE ORDERS (OUTPATIENT)
Dept: ADMINISTRATIVE | Facility: HOSPITAL | Age: 67
End: 2022-01-24

## 2022-01-24 DIAGNOSIS — R00.0 TACHYCARDIA: ICD-10-CM

## 2022-01-24 DIAGNOSIS — R00.0 SINUS TACHYCARDIA: ICD-10-CM

## 2022-01-24 DIAGNOSIS — R00.0 SINUS TACHYCARDIA: Primary | ICD-10-CM

## 2022-01-24 PROCEDURE — 84439 ASSAY OF FREE THYROXINE: CPT

## 2022-01-24 PROCEDURE — 84443 ASSAY THYROID STIM HORMONE: CPT

## 2022-01-24 PROCEDURE — 36415 COLL VENOUS BLD VENIPUNCTURE: CPT

## 2022-01-25 LAB
T4 FREE SERPL-MCNC: 1.22 NG/DL (ref 0.93–1.7)
TSH SERPL DL<=0.05 MIU/L-ACNC: 1.32 UIU/ML (ref 0.27–4.2)

## 2022-02-21 ENCOUNTER — OFFICE VISIT (OUTPATIENT)
Dept: PULMONOLOGY | Facility: CLINIC | Age: 67
End: 2022-02-21

## 2022-02-21 VITALS
OXYGEN SATURATION: 95 % | WEIGHT: 224 LBS | HEART RATE: 94 BPM | HEIGHT: 66 IN | SYSTOLIC BLOOD PRESSURE: 162 MMHG | TEMPERATURE: 97.1 F | BODY MASS INDEX: 36 KG/M2 | DIASTOLIC BLOOD PRESSURE: 92 MMHG

## 2022-02-21 DIAGNOSIS — E66.9 OBESITY (BMI 30-39.9): ICD-10-CM

## 2022-02-21 DIAGNOSIS — G47.33 OSA (OBSTRUCTIVE SLEEP APNEA): Primary | ICD-10-CM

## 2022-02-21 PROCEDURE — 99202 OFFICE O/P NEW SF 15 MIN: CPT | Performed by: NURSE PRACTITIONER

## 2022-02-21 RX ORDER — DILTIAZEM HYDROCHLORIDE 90 MG/1
CAPSULE, EXTENDED RELEASE ORAL
COMMUNITY
Start: 2022-02-07

## 2022-02-21 RX ORDER — CARVEDILOL 3.12 MG/1
TABLET ORAL
COMMUNITY
Start: 2022-02-15

## 2022-02-21 RX ORDER — HYDRALAZINE HYDROCHLORIDE 50 MG/1
TABLET, FILM COATED ORAL
COMMUNITY
Start: 2022-02-05

## 2022-02-21 RX ORDER — MECLIZINE HCL 12.5 MG/1
TABLET ORAL
COMMUNITY
Start: 2021-11-16

## 2022-02-21 NOTE — PROGRESS NOTES
"Chief Complaint  Sleep Apnea    Subjective          Annette Erick Galarza presents to [facility]  History of Present Illness     Ms. Galarza is a 66 year old female with a medical history significant for arthritis, asthma, diabetes, GERD, hyperlipidemia, and hypertension.    She presents today for evaluation of sleep apnea.  She states that she does sleep a lot during the day but that she is a nighttime person and stays up most of the night.  She tells me that she use to work night shift and since then she has always stayed up later and slept more during the day.  She denies any morning headaches.        Objective   Vital Signs:   /92   Pulse 94   Temp 97.1 °F (36.2 °C) (Temporal)   Ht 167.6 cm (66\")   Wt 102 kg (224 lb)   SpO2 95%   BMI 36.15 kg/m²     Physical Exam     GENERAL APPEARANCE: Well developed, well nourished, alert and cooperative, and appears to be in no acute distress.    HEAD: normocephalic. Atraumatic.    EYES: PERRL, EOMI. Vision is grossly intact.    THROAT: Oral cavity and pharynx normal. No inflammation, swelling, exudate, or lesions.     NECK: Neck supple.  No thyromegaly.    CARDIAC: Normal S1 and S2. No S3, S4 or murmurs. Rhythm is regular.     RESPIRATORY:Bilateral air entry positive. Bilateral diminished breath sounds. No wheezing, crackles or rhonchi noted.    GI: Positive bowel sounds. Soft, nondistended, nontender.     MUSCULOSKELETAL: No significant deformity or joint abnormality. No edema. Peripheral pulses intact. No varicosities.    NEUROLOGICAL: Strength and sensation symmetric and intact throughout.     PSYCHIATRIC: The mental examination revealed the patient was oriented to person, place, and time.     Result Review :   The following data was reviewed by: AILEEN Cabral on 02/21/2022:  Common labs    Common Labsle 12/28/21 12/28/21 1/9/22 1/9/22    1518 1518 1810 1810   Glucose  123 (A)  105 (A)   BUN "  18  17   Creatinine  1.23 (A)  1.23 (A)   eGFR Non  Am  44 (A)  44 (A)   Sodium  142  135 (A)   Potassium  4.4  4.0   Chloride  104  99   Calcium  9.8  9.5   Albumin  4.30  4.16   Total Bilirubin    0.2   Alkaline Phosphatase    83   AST (SGOT)    33 (A)   ALT (SGPT)    36 (A)   WBC 7.55  7.00    Hemoglobin 11.7 (A)  12.1    Hematocrit 36.1  39.0    Platelets 319  287    (A) Abnormal value       Comments are available for some flowsheets but are not being displayed.                    Assessment and Plan    Diagnoses and all orders for this visit:    1. BRITTANY (obstructive sleep apnea) (Primary)  -     Home Sleep Study; Future    2. Obesity (BMI 30-39.9)         BRITTANY:  -Ordered home sleep study to evaluate for sleep apnea.  -Patient's Body mass index is 36.15 kg/m². indicating that she is morbidly obese (BMI > 40 or > 35 with obesity - related health condition). Obesity-related health conditions include the following: hypertension, diabetes mellitus, dyslipidemias and GERD. Obesity is unchanged. BMI is is above average; BMI management plan is completed. We discussed portion control and increasing exercise.  - Patient was educated on positive airway pressure treatment.  As per CMS guidelines, more than 4 hours on 70% of observed nights is considered adherence. Patient was strongly encouraged to use CPAP as much as possible during sleep as more CPAP use is equal to more benefit. Use of heated humidification in positive airway pressure treatment to improve the adherence to the device.  In case of claustrophobia, we will provide the patient cognitive behavioral therapy and desensitization. Oral appliances use will be discussed with the patient in case of mild to moderate sleep apnea or if the patient with severe disease fail positive airway pressure treatment.       The patient was extensively educated on the consequences of untreated obstructive sleep apnea namely cardiovascular/metabolic disorder, neurocognitive  deficit, daytime sleepiness, motor vehicle accidents, depression, mood disorders and reduced quality of life.  At the end of conversation, the patient voices understanding of the disease process and treatment modality.  Patient also understands the risk of untreated obstructive sleep apnea and benefit benefits of the treatment.    Counseling time was greater than 10 minutes.      Follow Up   Return in about 3 months (around 5/21/2022).  Patient was given instructions and counseling regarding her condition or for health maintenance advice. Please see specific information pulled into the AVS if appropriate.

## 2022-03-04 ENCOUNTER — TELEPHONE (OUTPATIENT)
Dept: PULMONOLOGY | Facility: CLINIC | Age: 67
End: 2022-03-04

## 2022-03-04 DIAGNOSIS — G47.33 OSA (OBSTRUCTIVE SLEEP APNEA): Primary | ICD-10-CM

## 2022-03-04 NOTE — TELEPHONE ENCOUNTER
Called to speak with patient to report sleep study results. Waiting to hear back from her to see what DME Company she prefers.

## 2022-04-11 ENCOUNTER — HOSPITAL ENCOUNTER (OUTPATIENT)
Dept: MAMMOGRAPHY | Facility: HOSPITAL | Age: 67
Discharge: HOME | End: 2022-04-11
Admitting: PHYSICIAN ASSISTANT

## 2022-04-11 DIAGNOSIS — Z12.31 VISIT FOR SCREENING MAMMOGRAM: ICD-10-CM

## 2022-04-11 PROCEDURE — 77063 BREAST TOMOSYNTHESIS BI: CPT

## 2022-04-11 PROCEDURE — 77063 BREAST TOMOSYNTHESIS BI: CPT | Performed by: RADIOLOGY

## 2022-04-11 PROCEDURE — 77067 SCR MAMMO BI INCL CAD: CPT | Performed by: RADIOLOGY

## 2022-04-11 PROCEDURE — 77067 SCR MAMMO BI INCL CAD: CPT

## 2022-06-24 ENCOUNTER — APPOINTMENT (OUTPATIENT)
Dept: BONE DENSITY | Facility: HOSPITAL | Age: 67
End: 2022-06-24

## 2022-06-29 ENCOUNTER — LAB (OUTPATIENT)
Dept: LAB | Facility: HOSPITAL | Age: 67
End: 2022-06-29

## 2022-06-29 ENCOUNTER — TRANSCRIBE ORDERS (OUTPATIENT)
Dept: ADMINISTRATIVE | Facility: HOSPITAL | Age: 67
End: 2022-06-29

## 2022-06-29 DIAGNOSIS — E11.9 DIABETES MELLITUS WITHOUT COMPLICATION: ICD-10-CM

## 2022-06-29 DIAGNOSIS — D64.9 ANEMIA, UNSPECIFIED TYPE: ICD-10-CM

## 2022-06-29 DIAGNOSIS — D64.9 ANEMIA, UNSPECIFIED TYPE: Primary | ICD-10-CM

## 2022-06-29 PROCEDURE — 85025 COMPLETE CBC W/AUTO DIFF WBC: CPT

## 2022-06-29 PROCEDURE — 82746 ASSAY OF FOLIC ACID SERUM: CPT

## 2022-06-29 PROCEDURE — 84466 ASSAY OF TRANSFERRIN: CPT

## 2022-06-29 PROCEDURE — 82570 ASSAY OF URINE CREATININE: CPT

## 2022-06-29 PROCEDURE — 84155 ASSAY OF PROTEIN SERUM: CPT

## 2022-06-29 PROCEDURE — 86334 IMMUNOFIX E-PHORESIS SERUM: CPT

## 2022-06-29 PROCEDURE — 82607 VITAMIN B-12: CPT

## 2022-06-29 PROCEDURE — 80061 LIPID PANEL: CPT

## 2022-06-29 PROCEDURE — 83540 ASSAY OF IRON: CPT

## 2022-06-29 PROCEDURE — 83036 HEMOGLOBIN GLYCOSYLATED A1C: CPT

## 2022-06-29 PROCEDURE — 86335 IMMUNFIX E-PHORSIS/URINE/CSF: CPT

## 2022-06-29 PROCEDURE — 82784 ASSAY IGA/IGD/IGG/IGM EACH: CPT

## 2022-06-29 PROCEDURE — 82728 ASSAY OF FERRITIN: CPT

## 2022-06-29 PROCEDURE — 84166 PROTEIN E-PHORESIS/URINE/CSF: CPT

## 2022-06-29 PROCEDURE — 84165 PROTEIN E-PHORESIS SERUM: CPT

## 2022-06-29 PROCEDURE — 84156 ASSAY OF PROTEIN URINE: CPT

## 2022-06-29 PROCEDURE — 36415 COLL VENOUS BLD VENIPUNCTURE: CPT

## 2022-06-30 LAB
BASOPHILS # BLD AUTO: 0.05 10*3/MM3 (ref 0–0.2)
BASOPHILS NFR BLD AUTO: 0.5 % (ref 0–1.5)
CHOLEST SERPL-MCNC: 310 MG/DL (ref 0–200)
CREAT UR-MCNC: 40.8 MG/DL
DEPRECATED RDW RBC AUTO: 42.9 FL (ref 37–54)
EOSINOPHIL # BLD AUTO: 0.25 10*3/MM3 (ref 0–0.4)
EOSINOPHIL NFR BLD AUTO: 2.7 % (ref 0.3–6.2)
ERYTHROCYTE [DISTWIDTH] IN BLOOD BY AUTOMATED COUNT: 13.1 % (ref 12.3–15.4)
FERRITIN SERPL-MCNC: 89.9 NG/ML (ref 13–150)
FOLATE SERPL-MCNC: 14.2 NG/ML (ref 4.78–24.2)
HBA1C MFR BLD: 6.5 % (ref 4.8–5.6)
HCT VFR BLD AUTO: 37.2 % (ref 34–46.6)
HDLC SERPL-MCNC: 35 MG/DL (ref 40–60)
HGB BLD-MCNC: 11.7 G/DL (ref 12–15.9)
IMM GRANULOCYTES # BLD AUTO: 0.06 10*3/MM3 (ref 0–0.05)
IMM GRANULOCYTES NFR BLD AUTO: 0.7 % (ref 0–0.5)
IRON 24H UR-MRATE: 73 MCG/DL (ref 37–145)
IRON SATN MFR SERPL: 16 % (ref 20–50)
LDLC SERPL CALC-MCNC: 145 MG/DL (ref 0–100)
LDLC/HDLC SERPL: 4.02 {RATIO}
LYMPHOCYTES # BLD AUTO: 2.09 10*3/MM3 (ref 0.7–3.1)
LYMPHOCYTES NFR BLD AUTO: 22.9 % (ref 19.6–45.3)
MCH RBC QN AUTO: 28.6 PG (ref 26.6–33)
MCHC RBC AUTO-ENTMCNC: 31.5 G/DL (ref 31.5–35.7)
MCV RBC AUTO: 91 FL (ref 79–97)
MONOCYTES # BLD AUTO: 0.59 10*3/MM3 (ref 0.1–0.9)
MONOCYTES NFR BLD AUTO: 6.5 % (ref 5–12)
NEUTROPHILS NFR BLD AUTO: 6.1 10*3/MM3 (ref 1.7–7)
NEUTROPHILS NFR BLD AUTO: 66.7 % (ref 42.7–76)
NRBC BLD AUTO-RTO: 0 /100 WBC (ref 0–0.2)
PLATELET # BLD AUTO: 350 10*3/MM3 (ref 140–450)
PMV BLD AUTO: 10.8 FL (ref 6–12)
PROT ?TM UR-MCNC: 50 MG/DL
RBC # BLD AUTO: 4.09 10*6/MM3 (ref 3.77–5.28)
TIBC SERPL-MCNC: 465 MCG/DL (ref 298–536)
TRANSFERRIN SERPL-MCNC: 312 MG/DL (ref 200–360)
TRIGL SERPL-MCNC: 671 MG/DL (ref 0–150)
VIT B12 BLD-MCNC: 411 PG/ML (ref 211–946)
VLDLC SERPL-MCNC: 130 MG/DL (ref 5–40)
WBC NRBC COR # BLD: 9.14 10*3/MM3 (ref 3.4–10.8)

## 2022-07-01 LAB
ALBUMIN SERPL ELPH-MCNC: 3.5 G/DL (ref 2.9–4.4)
ALBUMIN/GLOB SERPL: 1 {RATIO} (ref 0.7–1.7)
ALPHA1 GLOB SERPL ELPH-MCNC: 0.2 G/DL (ref 0–0.4)
ALPHA2 GLOB SERPL ELPH-MCNC: 1.1 G/DL (ref 0.4–1)
B-GLOBULIN SERPL ELPH-MCNC: 1.3 G/DL (ref 0.7–1.3)
GAMMA GLOB SERPL ELPH-MCNC: 1.1 G/DL (ref 0.4–1.8)
GLOBULIN SER-MCNC: 3.7 G/DL (ref 2.2–3.9)
IGA SERPL-MCNC: 378 MG/DL (ref 87–352)
IGG SERPL-MCNC: 872 MG/DL (ref 586–1602)
IGM SERPL-MCNC: 82 MG/DL (ref 26–217)
INTERPRETATION SERPL IEP-IMP: ABNORMAL
LABORATORY COMMENT REPORT: ABNORMAL
M PROTEIN SERPL ELPH-MCNC: ABNORMAL G/DL
PROT SERPL-MCNC: 7.2 G/DL (ref 6–8.5)

## 2022-07-05 LAB
ALBUMIN 24H MFR UR ELPH: 80.7 %
ALPHA1 GLOB 24H MFR UR ELPH: 3.9 %
ALPHA2 GLOB 24H MFR UR ELPH: 3.3 %
B-GLOBULIN MFR UR ELPH: 8.6 %
GAMMA GLOB 24H MFR UR ELPH: 3.5 %
HIV 1 & 2 AB SER-IMP: NORMAL
INTERPRETATION UR IFE-IMP: NORMAL
M PROTEIN 24H MFR UR ELPH: NORMAL %
PROT UR-MCNC: 51.9 MG/DL

## 2022-07-07 ENCOUNTER — TRANSCRIBE ORDERS (OUTPATIENT)
Dept: ADMINISTRATIVE | Facility: HOSPITAL | Age: 67
End: 2022-07-07

## 2022-07-07 ENCOUNTER — LAB (OUTPATIENT)
Dept: LAB | Facility: HOSPITAL | Age: 67
End: 2022-07-07

## 2022-07-07 DIAGNOSIS — N18.30 STAGE 3 CHRONIC KIDNEY DISEASE, UNSPECIFIED WHETHER STAGE 3A OR 3B CKD: ICD-10-CM

## 2022-07-07 DIAGNOSIS — N18.30 STAGE 3 CHRONIC KIDNEY DISEASE, UNSPECIFIED WHETHER STAGE 3A OR 3B CKD: Primary | ICD-10-CM

## 2022-07-07 LAB
ALBUMIN SERPL-MCNC: 4.63 G/DL (ref 3.5–5.2)
ANION GAP SERPL CALCULATED.3IONS-SCNC: 14.3 MMOL/L (ref 5–15)
BUN SERPL-MCNC: 22 MG/DL (ref 8–23)
BUN/CREAT SERPL: 19.3 (ref 7–25)
CALCIUM SPEC-SCNC: 10.4 MG/DL (ref 8.6–10.5)
CHLORIDE SERPL-SCNC: 98 MMOL/L (ref 98–107)
CO2 SERPL-SCNC: 24.7 MMOL/L (ref 22–29)
CREAT SERPL-MCNC: 1.14 MG/DL (ref 0.57–1)
EGFRCR SERPLBLD CKD-EPI 2021: 53.5 ML/MIN/1.73
GLUCOSE SERPL-MCNC: 115 MG/DL (ref 65–99)
PHOSPHATE SERPL-MCNC: 3.5 MG/DL (ref 2.5–4.5)
POTASSIUM SERPL-SCNC: 4.7 MMOL/L (ref 3.5–5.2)
SODIUM SERPL-SCNC: 137 MMOL/L (ref 136–145)

## 2022-07-07 PROCEDURE — 36415 COLL VENOUS BLD VENIPUNCTURE: CPT

## 2022-07-07 PROCEDURE — 80069 RENAL FUNCTION PANEL: CPT

## 2022-08-26 ENCOUNTER — TRANSCRIBE ORDERS (OUTPATIENT)
Dept: INTERNAL MEDICINE | Facility: CLINIC | Age: 67
End: 2022-08-26

## 2022-08-26 ENCOUNTER — LAB (OUTPATIENT)
Dept: LAB | Facility: HOSPITAL | Age: 67
End: 2022-08-26

## 2022-08-26 DIAGNOSIS — N18.30 STAGE 3 CHRONIC KIDNEY DISEASE, UNSPECIFIED WHETHER STAGE 3A OR 3B CKD: ICD-10-CM

## 2022-08-26 DIAGNOSIS — N18.30 STAGE 3 CHRONIC KIDNEY DISEASE, UNSPECIFIED WHETHER STAGE 3A OR 3B CKD: Primary | ICD-10-CM

## 2022-08-26 PROCEDURE — 36415 COLL VENOUS BLD VENIPUNCTURE: CPT

## 2022-08-26 PROCEDURE — 80069 RENAL FUNCTION PANEL: CPT

## 2022-08-26 PROCEDURE — 84156 ASSAY OF PROTEIN URINE: CPT

## 2022-08-26 PROCEDURE — 82570 ASSAY OF URINE CREATININE: CPT

## 2022-08-26 PROCEDURE — 81001 URINALYSIS AUTO W/SCOPE: CPT

## 2022-08-27 LAB
ALBUMIN SERPL-MCNC: 4.3 G/DL (ref 3.5–5.2)
ANION GAP SERPL CALCULATED.3IONS-SCNC: 13.9 MMOL/L (ref 5–15)
BACTERIA UR QL AUTO: NORMAL /HPF
BILIRUB UR QL STRIP: NEGATIVE
BUN SERPL-MCNC: 36 MG/DL (ref 8–23)
BUN/CREAT SERPL: 25.2 (ref 7–25)
CALCIUM SPEC-SCNC: 10 MG/DL (ref 8.6–10.5)
CHLORIDE SERPL-SCNC: 101 MMOL/L (ref 98–107)
CLARITY UR: CLEAR
CO2 SERPL-SCNC: 21.1 MMOL/L (ref 22–29)
COLOR UR: YELLOW
CREAT SERPL-MCNC: 1.43 MG/DL (ref 0.57–1)
CREAT UR-MCNC: 45.7 MG/DL
EGFRCR SERPLBLD CKD-EPI 2021: 40.8 ML/MIN/1.73
GLUCOSE SERPL-MCNC: 91 MG/DL (ref 65–99)
GLUCOSE UR STRIP-MCNC: ABNORMAL MG/DL
HGB UR QL STRIP.AUTO: NEGATIVE
HYALINE CASTS UR QL AUTO: NORMAL /LPF
KETONES UR QL STRIP: NEGATIVE
LEUKOCYTE ESTERASE UR QL STRIP.AUTO: NEGATIVE
NITRITE UR QL STRIP: NEGATIVE
PH UR STRIP.AUTO: 6 [PH] (ref 5–8)
PHOSPHATE SERPL-MCNC: 3.5 MG/DL (ref 2.5–4.5)
POTASSIUM SERPL-SCNC: 4.9 MMOL/L (ref 3.5–5.2)
PROT ?TM UR-MCNC: 37.9 MG/DL
PROT UR QL STRIP: ABNORMAL
RBC # UR STRIP: NORMAL /HPF
REF LAB TEST METHOD: NORMAL
SODIUM SERPL-SCNC: 136 MMOL/L (ref 136–145)
SP GR UR STRIP: 1.02 (ref 1–1.03)
SQUAMOUS #/AREA URNS HPF: NORMAL /HPF
UROBILINOGEN UR QL STRIP: ABNORMAL
WBC # UR STRIP: NORMAL /HPF

## 2022-11-01 ENCOUNTER — TRANSCRIBE ORDERS (OUTPATIENT)
Dept: INTERVENTIONAL RADIOLOGY/VASCULAR | Facility: HOSPITAL | Age: 67
End: 2022-11-01

## 2022-11-01 ENCOUNTER — LAB (OUTPATIENT)
Dept: LAB | Facility: HOSPITAL | Age: 67
End: 2022-11-01

## 2022-11-01 DIAGNOSIS — N18.30 STAGE 3 CHRONIC KIDNEY DISEASE, UNSPECIFIED WHETHER STAGE 3A OR 3B CKD: Primary | ICD-10-CM

## 2022-11-01 DIAGNOSIS — N18.30 STAGE 3 CHRONIC KIDNEY DISEASE, UNSPECIFIED WHETHER STAGE 3A OR 3B CKD: ICD-10-CM

## 2022-11-01 PROCEDURE — 36415 COLL VENOUS BLD VENIPUNCTURE: CPT

## 2022-11-01 PROCEDURE — 81003 URINALYSIS AUTO W/O SCOPE: CPT

## 2022-11-01 PROCEDURE — 80069 RENAL FUNCTION PANEL: CPT

## 2022-11-01 PROCEDURE — 82570 ASSAY OF URINE CREATININE: CPT

## 2022-11-01 PROCEDURE — 85025 COMPLETE CBC W/AUTO DIFF WBC: CPT

## 2022-11-01 PROCEDURE — 84156 ASSAY OF PROTEIN URINE: CPT

## 2022-11-02 LAB
ALBUMIN SERPL-MCNC: 4.3 G/DL (ref 3.5–5.2)
ANION GAP SERPL CALCULATED.3IONS-SCNC: 16.6 MMOL/L (ref 5–15)
BASOPHILS # BLD AUTO: 0.06 10*3/MM3 (ref 0–0.2)
BASOPHILS NFR BLD AUTO: 0.5 % (ref 0–1.5)
BILIRUB UR QL STRIP: NEGATIVE
BUN SERPL-MCNC: 21 MG/DL (ref 8–23)
BUN/CREAT SERPL: 19.1 (ref 7–25)
CALCIUM SPEC-SCNC: 9.7 MG/DL (ref 8.6–10.5)
CHLORIDE SERPL-SCNC: 96 MMOL/L (ref 98–107)
CLARITY UR: CLEAR
CO2 SERPL-SCNC: 25.4 MMOL/L (ref 22–29)
COLOR UR: YELLOW
CREAT SERPL-MCNC: 1.1 MG/DL (ref 0.57–1)
CREAT UR-MCNC: 29.1 MG/DL
DEPRECATED RDW RBC AUTO: 42.7 FL (ref 37–54)
EGFRCR SERPLBLD CKD-EPI 2021: 55.9 ML/MIN/1.73
EOSINOPHIL # BLD AUTO: 0.2 10*3/MM3 (ref 0–0.4)
EOSINOPHIL NFR BLD AUTO: 1.8 % (ref 0.3–6.2)
ERYTHROCYTE [DISTWIDTH] IN BLOOD BY AUTOMATED COUNT: 13 % (ref 12.3–15.4)
GLUCOSE SERPL-MCNC: 120 MG/DL (ref 65–99)
GLUCOSE UR STRIP-MCNC: NEGATIVE MG/DL
HCT VFR BLD AUTO: 34 % (ref 34–46.6)
HGB BLD-MCNC: 10.9 G/DL (ref 12–15.9)
HGB UR QL STRIP.AUTO: NEGATIVE
IMM GRANULOCYTES # BLD AUTO: 0.08 10*3/MM3 (ref 0–0.05)
IMM GRANULOCYTES NFR BLD AUTO: 0.7 % (ref 0–0.5)
KETONES UR QL STRIP: NEGATIVE
LEUKOCYTE ESTERASE UR QL STRIP.AUTO: NEGATIVE
LYMPHOCYTES # BLD AUTO: 2.59 10*3/MM3 (ref 0.7–3.1)
LYMPHOCYTES NFR BLD AUTO: 23.3 % (ref 19.6–45.3)
MCH RBC QN AUTO: 28.9 PG (ref 26.6–33)
MCHC RBC AUTO-ENTMCNC: 32.1 G/DL (ref 31.5–35.7)
MCV RBC AUTO: 90.2 FL (ref 79–97)
MONOCYTES # BLD AUTO: 0.65 10*3/MM3 (ref 0.1–0.9)
MONOCYTES NFR BLD AUTO: 5.9 % (ref 5–12)
NEUTROPHILS NFR BLD AUTO: 67.8 % (ref 42.7–76)
NEUTROPHILS NFR BLD AUTO: 7.53 10*3/MM3 (ref 1.7–7)
NITRITE UR QL STRIP: NEGATIVE
NRBC BLD AUTO-RTO: 0 /100 WBC (ref 0–0.2)
PH UR STRIP.AUTO: 6 [PH] (ref 5–8)
PHOSPHATE SERPL-MCNC: 3.1 MG/DL (ref 2.5–4.5)
PLATELET # BLD AUTO: 380 10*3/MM3 (ref 140–450)
PMV BLD AUTO: 10.7 FL (ref 6–12)
POTASSIUM SERPL-SCNC: 4.7 MMOL/L (ref 3.5–5.2)
PROT ?TM UR-MCNC: 22.3 MG/DL
PROT UR QL STRIP: ABNORMAL
RBC # BLD AUTO: 3.77 10*6/MM3 (ref 3.77–5.28)
SODIUM SERPL-SCNC: 138 MMOL/L (ref 136–145)
SP GR UR STRIP: 1.01 (ref 1–1.03)
UROBILINOGEN UR QL STRIP: ABNORMAL
WBC NRBC COR # BLD: 11.11 10*3/MM3 (ref 3.4–10.8)

## 2023-03-28 ENCOUNTER — LAB (OUTPATIENT)
Dept: LAB | Facility: HOSPITAL | Age: 68
End: 2023-03-28
Payer: MEDICARE

## 2023-03-28 ENCOUNTER — TRANSCRIBE ORDERS (OUTPATIENT)
Dept: INFUSION THERAPY | Facility: HOSPITAL | Age: 68
End: 2023-03-28
Payer: MEDICARE

## 2023-03-28 DIAGNOSIS — D64.9 ANEMIA, UNSPECIFIED TYPE: ICD-10-CM

## 2023-03-28 DIAGNOSIS — N18.30 STAGE 3 CHRONIC KIDNEY DISEASE, UNSPECIFIED WHETHER STAGE 3A OR 3B CKD: Primary | ICD-10-CM

## 2023-03-28 DIAGNOSIS — N18.30 STAGE 3 CHRONIC KIDNEY DISEASE, UNSPECIFIED WHETHER STAGE 3A OR 3B CKD: ICD-10-CM

## 2023-03-28 PROCEDURE — 36415 COLL VENOUS BLD VENIPUNCTURE: CPT

## 2023-03-28 PROCEDURE — 82570 ASSAY OF URINE CREATININE: CPT

## 2023-03-28 PROCEDURE — 81001 URINALYSIS AUTO W/SCOPE: CPT

## 2023-03-28 PROCEDURE — 84466 ASSAY OF TRANSFERRIN: CPT

## 2023-03-28 PROCEDURE — 83540 ASSAY OF IRON: CPT

## 2023-03-28 PROCEDURE — 80069 RENAL FUNCTION PANEL: CPT

## 2023-03-28 PROCEDURE — 84156 ASSAY OF PROTEIN URINE: CPT

## 2023-03-28 PROCEDURE — 82728 ASSAY OF FERRITIN: CPT

## 2023-03-28 PROCEDURE — 85025 COMPLETE CBC W/AUTO DIFF WBC: CPT

## 2023-03-29 LAB
ALBUMIN SERPL-MCNC: 4.2 G/DL (ref 3.5–5.2)
ANION GAP SERPL CALCULATED.3IONS-SCNC: 10 MMOL/L (ref 5–15)
BACTERIA UR QL AUTO: NORMAL /HPF
BASOPHILS # BLD AUTO: 0.07 10*3/MM3 (ref 0–0.2)
BASOPHILS NFR BLD AUTO: 0.9 % (ref 0–1.5)
BILIRUB UR QL STRIP: NEGATIVE
BUN SERPL-MCNC: 23 MG/DL (ref 8–23)
BUN/CREAT SERPL: 18.4 (ref 7–25)
CALCIUM SPEC-SCNC: 9.5 MG/DL (ref 8.6–10.5)
CHLORIDE SERPL-SCNC: 97 MMOL/L (ref 98–107)
CLARITY UR: CLEAR
CO2 SERPL-SCNC: 26 MMOL/L (ref 22–29)
COLOR UR: YELLOW
CREAT SERPL-MCNC: 1.25 MG/DL (ref 0.57–1)
CREAT UR-MCNC: 59.4 MG/DL
DEPRECATED RDW RBC AUTO: 42.8 FL (ref 37–54)
EGFRCR SERPLBLD CKD-EPI 2021: 47.6 ML/MIN/1.73
EOSINOPHIL # BLD AUTO: 0.41 10*3/MM3 (ref 0–0.4)
EOSINOPHIL NFR BLD AUTO: 5.5 % (ref 0.3–6.2)
ERYTHROCYTE [DISTWIDTH] IN BLOOD BY AUTOMATED COUNT: 13.4 % (ref 12.3–15.4)
FERRITIN SERPL-MCNC: 92 NG/ML (ref 13–150)
GLUCOSE SERPL-MCNC: 125 MG/DL (ref 65–99)
GLUCOSE UR STRIP-MCNC: NEGATIVE MG/DL
HCT VFR BLD AUTO: 33.4 % (ref 34–46.6)
HGB BLD-MCNC: 10.7 G/DL (ref 12–15.9)
HGB UR QL STRIP.AUTO: NEGATIVE
HYALINE CASTS UR QL AUTO: NORMAL /LPF
IMM GRANULOCYTES # BLD AUTO: 0.03 10*3/MM3 (ref 0–0.05)
IMM GRANULOCYTES NFR BLD AUTO: 0.4 % (ref 0–0.5)
IRON 24H UR-MRATE: 57 MCG/DL (ref 37–145)
IRON SATN MFR SERPL: 13 % (ref 20–50)
KETONES UR QL STRIP: NEGATIVE
LEUKOCYTE ESTERASE UR QL STRIP.AUTO: NEGATIVE
LYMPHOCYTES # BLD AUTO: 1.93 10*3/MM3 (ref 0.7–3.1)
LYMPHOCYTES NFR BLD AUTO: 26.1 % (ref 19.6–45.3)
MCH RBC QN AUTO: 28.2 PG (ref 26.6–33)
MCHC RBC AUTO-ENTMCNC: 32 G/DL (ref 31.5–35.7)
MCV RBC AUTO: 87.9 FL (ref 79–97)
MONOCYTES # BLD AUTO: 0.61 10*3/MM3 (ref 0.1–0.9)
MONOCYTES NFR BLD AUTO: 8.2 % (ref 5–12)
NEUTROPHILS NFR BLD AUTO: 4.35 10*3/MM3 (ref 1.7–7)
NEUTROPHILS NFR BLD AUTO: 58.9 % (ref 42.7–76)
NITRITE UR QL STRIP: NEGATIVE
NRBC BLD AUTO-RTO: 0 /100 WBC (ref 0–0.2)
PH UR STRIP.AUTO: 6 [PH] (ref 5–8)
PHOSPHATE SERPL-MCNC: 3.8 MG/DL (ref 2.5–4.5)
PLATELET # BLD AUTO: 393 10*3/MM3 (ref 140–450)
PMV BLD AUTO: 10.8 FL (ref 6–12)
POTASSIUM SERPL-SCNC: 4.6 MMOL/L (ref 3.5–5.2)
PROT ?TM UR-MCNC: 65.6 MG/DL
PROT UR QL STRIP: ABNORMAL
RBC # BLD AUTO: 3.8 10*6/MM3 (ref 3.77–5.28)
RBC # UR STRIP: NORMAL /HPF
REF LAB TEST METHOD: NORMAL
SODIUM SERPL-SCNC: 133 MMOL/L (ref 136–145)
SP GR UR STRIP: 1.01 (ref 1–1.03)
SQUAMOUS #/AREA URNS HPF: NORMAL /HPF
TIBC SERPL-MCNC: 441 MCG/DL (ref 298–536)
TRANSFERRIN SERPL-MCNC: 296 MG/DL (ref 200–360)
UROBILINOGEN UR QL STRIP: ABNORMAL
WBC # UR STRIP: NORMAL /HPF
WBC NRBC COR # BLD: 7.4 10*3/MM3 (ref 3.4–10.8)

## 2023-07-31 ENCOUNTER — TRANSCRIBE ORDERS (OUTPATIENT)
Dept: ADMINISTRATIVE | Facility: HOSPITAL | Age: 68
End: 2023-07-31
Payer: MEDICARE

## 2023-07-31 ENCOUNTER — LAB (OUTPATIENT)
Dept: LAB | Facility: HOSPITAL | Age: 68
End: 2023-07-31
Payer: MEDICARE

## 2023-07-31 DIAGNOSIS — D50.9 IRON DEFICIENCY ANEMIA, UNSPECIFIED IRON DEFICIENCY ANEMIA TYPE: ICD-10-CM

## 2023-07-31 DIAGNOSIS — E11.21 DIABETIC GLOMERULOPATHY: ICD-10-CM

## 2023-07-31 DIAGNOSIS — D50.9 IRON DEFICIENCY ANEMIA, UNSPECIFIED IRON DEFICIENCY ANEMIA TYPE: Primary | ICD-10-CM

## 2023-07-31 PROCEDURE — 80048 BASIC METABOLIC PNL TOTAL CA: CPT

## 2023-07-31 PROCEDURE — 36415 COLL VENOUS BLD VENIPUNCTURE: CPT

## 2023-07-31 PROCEDURE — 83540 ASSAY OF IRON: CPT

## 2023-07-31 PROCEDURE — 84466 ASSAY OF TRANSFERRIN: CPT

## 2023-07-31 PROCEDURE — 82728 ASSAY OF FERRITIN: CPT

## 2023-08-01 LAB
ANION GAP SERPL CALCULATED.3IONS-SCNC: 13.9 MMOL/L (ref 5–15)
BUN SERPL-MCNC: 22 MG/DL (ref 8–23)
BUN/CREAT SERPL: 21.8 (ref 7–25)
CALCIUM SPEC-SCNC: 9.9 MG/DL (ref 8.6–10.5)
CHLORIDE SERPL-SCNC: 96 MMOL/L (ref 98–107)
CO2 SERPL-SCNC: 27.1 MMOL/L (ref 22–29)
CREAT SERPL-MCNC: 1.01 MG/DL (ref 0.57–1)
EGFRCR SERPLBLD CKD-EPI 2021: 61.5 ML/MIN/1.73
FERRITIN SERPL-MCNC: 65 NG/ML (ref 13–150)
GLUCOSE SERPL-MCNC: 133 MG/DL (ref 65–99)
IRON 24H UR-MRATE: 48 MCG/DL (ref 37–145)
IRON SATN MFR SERPL: 11 % (ref 20–50)
POTASSIUM SERPL-SCNC: 4.3 MMOL/L (ref 3.5–5.2)
SODIUM SERPL-SCNC: 137 MMOL/L (ref 136–145)
TIBC SERPL-MCNC: 422 MCG/DL (ref 298–536)
TRANSFERRIN SERPL-MCNC: 283 MG/DL (ref 200–360)

## 2024-01-10 ENCOUNTER — TRANSCRIBE ORDERS (OUTPATIENT)
Dept: ADMINISTRATIVE | Facility: HOSPITAL | Age: 69
End: 2024-01-10
Payer: MEDICARE

## 2024-01-10 ENCOUNTER — OFFICE VISIT (OUTPATIENT)
Dept: PULMONOLOGY | Facility: CLINIC | Age: 69
End: 2024-01-10
Payer: MEDICARE

## 2024-01-10 ENCOUNTER — LAB (OUTPATIENT)
Dept: LAB | Facility: HOSPITAL | Age: 69
End: 2024-01-10
Payer: MEDICARE

## 2024-01-10 VITALS
BODY MASS INDEX: 35.36 KG/M2 | OXYGEN SATURATION: 98 % | WEIGHT: 220 LBS | HEART RATE: 105 BPM | SYSTOLIC BLOOD PRESSURE: 140 MMHG | HEIGHT: 66 IN | DIASTOLIC BLOOD PRESSURE: 90 MMHG

## 2024-01-10 DIAGNOSIS — E11.21: ICD-10-CM

## 2024-01-10 DIAGNOSIS — D64.9 ANEMIA, UNSPECIFIED TYPE: Primary | ICD-10-CM

## 2024-01-10 DIAGNOSIS — J96.21 ACUTE ON CHRONIC RESPIRATORY FAILURE WITH HYPOXIA: Primary | ICD-10-CM

## 2024-01-10 DIAGNOSIS — R06.02 SOB (SHORTNESS OF BREATH): ICD-10-CM

## 2024-01-10 DIAGNOSIS — N18.30 STAGE 3 CHRONIC KIDNEY DISEASE, UNSPECIFIED WHETHER STAGE 3A OR 3B CKD: ICD-10-CM

## 2024-01-10 DIAGNOSIS — D64.9 ANEMIA, UNSPECIFIED TYPE: ICD-10-CM

## 2024-01-10 DIAGNOSIS — G47.33 OSA (OBSTRUCTIVE SLEEP APNEA): ICD-10-CM

## 2024-01-10 PROCEDURE — 82570 ASSAY OF URINE CREATININE: CPT

## 2024-01-10 PROCEDURE — 85025 COMPLETE CBC W/AUTO DIFF WBC: CPT

## 2024-01-10 PROCEDURE — 81001 URINALYSIS AUTO W/SCOPE: CPT

## 2024-01-10 PROCEDURE — 84466 ASSAY OF TRANSFERRIN: CPT

## 2024-01-10 PROCEDURE — 83970 ASSAY OF PARATHORMONE: CPT

## 2024-01-10 PROCEDURE — 80069 RENAL FUNCTION PANEL: CPT

## 2024-01-10 PROCEDURE — 83540 ASSAY OF IRON: CPT

## 2024-01-10 PROCEDURE — 36415 COLL VENOUS BLD VENIPUNCTURE: CPT

## 2024-01-10 PROCEDURE — 82306 VITAMIN D 25 HYDROXY: CPT

## 2024-01-10 PROCEDURE — 84156 ASSAY OF PROTEIN URINE: CPT

## 2024-01-10 NOTE — PROGRESS NOTES
Interval history since last visit:    Recent hospitalizations:    Investigations (imaging, PFT's, labs, sleep study, record requests, etc.)    Have you had the Influenza Vaccine? no   Would you like to receive this Vaccine today? no    Have you had the Pneumonia Vaccine?  no  Would you like to receive this Vaccine today? no    Subjective    Annette Galarza presents for the following Sleep Apnea  .    History of Present Illness   Patient is here for a follow-up for obstructive sleep apnea and daytime fatigue and tiredness.  Sleep study was reviewed and discussed with patient shows mild sleep apnea and major desaturations overnight.  Patient has been reluctant to use the CPAP machine.  Educated her extensively about the side effects of untreated sleep apnea.    Review of Systems  Positive for daytime fatigue and tiredness  Active Problems:  Problem List Items Addressed This Visit    None  Visit Diagnoses       Acute on chronic respiratory failure with hypoxia    -  Primary    Relevant Orders    Miscellaneous DME    BRITTANY (obstructive sleep apnea)        Relevant Orders    Detailed AutoPAP Order    SOB (shortness of breath)        Relevant Orders    Complete PFT - Pre & Post Bronchodilator            Past Medical History:  Past Medical History:   Diagnosis Date    Arthritis     Asthma     Diabetic peripheral neuropathy     Esophageal reflux     Gout     Hyperlipidemia     Hypertension        Family History:  Family History   Problem Relation Age of Onset    Diabetes Other     Hypertension Other     Breast cancer Neg Hx        Social History:  Social History     Tobacco Use    Smoking status: Former    Smokeless tobacco: Never   Substance Use Topics    Alcohol use: No       Current Medications:  Current Outpatient Medications   Medication Sig Dispense Refill    albuterol sulfate HFA (Ventolin HFA) 108 (90 Base) MCG/ACT inhaler Inhale 2 puffs Every 4 (Four) Hours As Needed for Wheezing. 18 g 0    allopurinol  "(ZYLOPRIM) 100 MG tablet Take 1 tablet by mouth Every Night.      aspirin 81 MG tablet Take 1 tablet by mouth Daily.      carvedilol (COREG) 3.125 MG tablet       cloNIDine (CATAPRES) 0.1 MG tablet Take 1 tablet by mouth 2 (Two) Times a Day.  0    dilTIAZem SR (CARDIZEM SR) 90 MG 12 hr capsule       DULoxetine (CYMBALTA) 30 MG capsule Take 1 capsule by mouth Daily.      hydrALAZINE (APRESOLINE) 50 MG tablet       meclizine (ANTIVERT) 12.5 MG tablet       melatonin 5 MG tablet tablet Take 1 tablet by mouth Every Night.      metFORMIN (GLUCOPHAGE) 500 MG tablet Take 1 tablet by mouth Daily.      nitroglycerin (NITROSTAT) 0.4 MG SL tablet Place 1 tablet under the tongue Every 5 (Five) Minutes As Needed for Chest Pain (if systolic BP greater than 100 mm/Hg.). 30 tablet 12    tiZANidine (ZANAFLEX) 4 MG tablet Take 1 tablet by mouth Every 8 (Eight) Hours As Needed.  0    losartan (COZAAR) 100 MG tablet Take 100 mg by mouth Daily.       No current facility-administered medications for this visit.       Allergies:  Allergies   Allergen Reactions    Meperidine Delirium and Hallucinations    Morphine Delirium, Irritability and Hallucinations    Adhesive Tape Itching, Dermatitis and Rash       Vitals:  /90   Pulse 105   Ht 167.6 cm (66\")   Wt 99.8 kg (220 lb)   SpO2 98%   BMI 35.51 kg/m²     Imaging:    Imaging Results (Most Recent)       None            Pulmonary Functions Testing Results:    No results found for: \"FEV1\", \"FVC\", \"EOH4ETM\", \"TLC\", \"DLCO\"    Results for orders placed or performed in visit on 07/31/23   Ferritin    Specimen: Blood   Result Value Ref Range    Ferritin 65.00 13.00 - 150.00 ng/mL   Iron Profile    Specimen: Blood   Result Value Ref Range    Iron 48 37 - 145 mcg/dL    Iron Saturation (TSAT) 11 (L) 20 - 50 %    Transferrin 283 200 - 360 mg/dL    TIBC 422 298 - 536 mcg/dL   Basic Metabolic Panel    Specimen: Blood   Result Value Ref Range    Glucose 133 (H) 65 - 99 mg/dL    BUN 22 8 - 23 " mg/dL    Creatinine 1.01 (H) 0.57 - 1.00 mg/dL    Sodium 137 136 - 145 mmol/L    Potassium 4.3 3.5 - 5.2 mmol/L    Chloride 96 (L) 98 - 107 mmol/L    CO2 27.1 22.0 - 29.0 mmol/L    Calcium 9.9 8.6 - 10.5 mg/dL    BUN/Creatinine Ratio 21.8 7.0 - 25.0    Anion Gap 13.9 5.0 - 15.0 mmol/L    eGFR 61.5 >60.0 mL/min/1.73       Objective   Physical Exam    General- normal in appearance, not in any acute distress    HEENT- pupils equally reactive to light, normal in size, no scleral icterus    Neck-supple    Respiratory-respirations normal-on auscultation no wheezing no crackles,     Cardiovascular-  Normal S1 and S2. No S3, S4 or murmurs. No JVD, no carotid bruit and no edema, pulses normal bilaterally     GI-nontender nondistended bowel sounds positive    CNS-nonfocal    Musculoskeletal -no edema  Extremities- no obvious deformity noticed     Psychiatric-mood good, good eye contact, alert awake oriented  Skin- no visible rash        Assessment & Plan     BRITTANY-sleep study reviewed and discussed with the patient.  was educated about ill health effects of sleep apnea and what all effects it can have on health in long-term.  Which includes blood clots, arrhythmias, stroke, depression, hypothyroidism. was also educated about the pathophysiology of sleep apnea and how weight contributes in it.  Patient understood the conversation well and will try and do exercise and eat right kind of food to lose weight.     Initially was reluctant of using the CPAP machine.  After extensive education patient agreed to use it.      Shortness of breath-will get PFTs and treat accordingly.      Sleep study showed overnight hypoxia.  Does not use the CPAP machine which she is very reluctant.  Prescribed her oxygen to be used overnight.    Obesity-BMI 35.5.  Exercise and diet counseling done.       ICD-10-CM ICD-9-CM   1. Acute on chronic respiratory failure with hypoxia  J96.21 518.84     799.02   2. BRITTANY (obstructive sleep apnea)  G47.33 327.23    3. SOB (shortness of breath)  R06.02 786.05       No follow-ups on file.

## 2024-01-11 LAB
25(OH)D3 SERPL-MCNC: 27.6 NG/ML (ref 30–100)
ALBUMIN SERPL-MCNC: 4.5 G/DL (ref 3.5–5.2)
ANION GAP SERPL CALCULATED.3IONS-SCNC: 15.2 MMOL/L (ref 5–15)
BACTERIA UR QL AUTO: NORMAL /HPF
BASOPHILS # BLD AUTO: 0.06 10*3/MM3 (ref 0–0.2)
BASOPHILS NFR BLD AUTO: 0.5 % (ref 0–1.5)
BILIRUB UR QL STRIP: NEGATIVE
BUN SERPL-MCNC: 26 MG/DL (ref 8–23)
BUN/CREAT SERPL: 18.4 (ref 7–25)
CALCIUM SPEC-SCNC: 10.1 MG/DL (ref 8.6–10.5)
CHLORIDE SERPL-SCNC: 94 MMOL/L (ref 98–107)
CLARITY UR: CLEAR
CO2 SERPL-SCNC: 25.8 MMOL/L (ref 22–29)
COLOR UR: YELLOW
CREAT SERPL-MCNC: 1.41 MG/DL (ref 0.57–1)
CREAT UR-MCNC: 52.8 MG/DL
DEPRECATED RDW RBC AUTO: 46.1 FL (ref 37–54)
EGFRCR SERPLBLD CKD-EPI 2021: 41.2 ML/MIN/1.73
EOSINOPHIL # BLD AUTO: 0.33 10*3/MM3 (ref 0–0.4)
EOSINOPHIL NFR BLD AUTO: 2.9 % (ref 0.3–6.2)
ERYTHROCYTE [DISTWIDTH] IN BLOOD BY AUTOMATED COUNT: 13.7 % (ref 12.3–15.4)
GLUCOSE SERPL-MCNC: 113 MG/DL (ref 65–99)
GLUCOSE UR STRIP-MCNC: NEGATIVE MG/DL
HCT VFR BLD AUTO: 36.4 % (ref 34–46.6)
HGB BLD-MCNC: 11.9 G/DL (ref 12–15.9)
HGB UR QL STRIP.AUTO: NEGATIVE
HYALINE CASTS UR QL AUTO: NORMAL /LPF
IMM GRANULOCYTES # BLD AUTO: 0.1 10*3/MM3 (ref 0–0.05)
IMM GRANULOCYTES NFR BLD AUTO: 0.9 % (ref 0–0.5)
IRON 24H UR-MRATE: 81 MCG/DL (ref 37–145)
IRON SATN MFR SERPL: 21 % (ref 20–50)
KETONES UR QL STRIP: NEGATIVE
LEUKOCYTE ESTERASE UR QL STRIP.AUTO: NEGATIVE
LYMPHOCYTES # BLD AUTO: 2.43 10*3/MM3 (ref 0.7–3.1)
LYMPHOCYTES NFR BLD AUTO: 21.2 % (ref 19.6–45.3)
MCH RBC QN AUTO: 30 PG (ref 26.6–33)
MCHC RBC AUTO-ENTMCNC: 32.7 G/DL (ref 31.5–35.7)
MCV RBC AUTO: 91.7 FL (ref 79–97)
MONOCYTES # BLD AUTO: 0.81 10*3/MM3 (ref 0.1–0.9)
MONOCYTES NFR BLD AUTO: 7.1 % (ref 5–12)
NEUTROPHILS NFR BLD AUTO: 67.4 % (ref 42.7–76)
NEUTROPHILS NFR BLD AUTO: 7.75 10*3/MM3 (ref 1.7–7)
NITRITE UR QL STRIP: NEGATIVE
NRBC BLD AUTO-RTO: 0 /100 WBC (ref 0–0.2)
PH UR STRIP.AUTO: 6.5 [PH] (ref 5–8)
PHOSPHATE SERPL-MCNC: 3.5 MG/DL (ref 2.5–4.5)
PLATELET # BLD AUTO: 321 10*3/MM3 (ref 140–450)
PMV BLD AUTO: 10.8 FL (ref 6–12)
POTASSIUM SERPL-SCNC: 4.4 MMOL/L (ref 3.5–5.2)
PROT ?TM UR-MCNC: 59.2 MG/DL
PROT UR QL STRIP: ABNORMAL
PTH-INTACT SERPL-MCNC: 45 PG/ML (ref 15–65)
RBC # BLD AUTO: 3.97 10*6/MM3 (ref 3.77–5.28)
RBC # UR STRIP: NORMAL /HPF
REF LAB TEST METHOD: NORMAL
SODIUM SERPL-SCNC: 135 MMOL/L (ref 136–145)
SP GR UR STRIP: 1.01 (ref 1–1.03)
SQUAMOUS #/AREA URNS HPF: NORMAL /HPF
TIBC SERPL-MCNC: 383 MCG/DL (ref 298–536)
TRANSFERRIN SERPL-MCNC: 257 MG/DL (ref 200–360)
UROBILINOGEN UR QL STRIP: ABNORMAL
WBC # UR STRIP: NORMAL /HPF
WBC NRBC COR # BLD AUTO: 11.48 10*3/MM3 (ref 3.4–10.8)

## 2024-01-29 ENCOUNTER — OFFICE VISIT (OUTPATIENT)
Dept: UROLOGY | Facility: CLINIC | Age: 69
End: 2024-01-29
Payer: MEDICARE

## 2024-01-29 VITALS
HEIGHT: 66 IN | WEIGHT: 226.2 LBS | SYSTOLIC BLOOD PRESSURE: 169 MMHG | DIASTOLIC BLOOD PRESSURE: 91 MMHG | HEART RATE: 78 BPM | BODY MASS INDEX: 36.35 KG/M2

## 2024-01-29 DIAGNOSIS — N32.81 DETRUSOR INSTABILITY OF BLADDER: ICD-10-CM

## 2024-01-29 DIAGNOSIS — N39.46 MIXED STRESS AND URGE URINARY INCONTINENCE: ICD-10-CM

## 2024-01-29 DIAGNOSIS — N32.81 OVERACTIVE BLADDER: Primary | ICD-10-CM

## 2024-01-29 DIAGNOSIS — R35.0 FREQUENCY OF MICTURITION: ICD-10-CM

## 2024-01-29 DIAGNOSIS — R33.9 INCOMPLETE EMPTYING OF BLADDER: ICD-10-CM

## 2024-01-29 LAB
BILIRUB BLD-MCNC: NEGATIVE MG/DL
CLARITY, POC: CLEAR
COLOR UR: YELLOW
EXPIRATION DATE: ABNORMAL
GLUCOSE UR STRIP-MCNC: NEGATIVE MG/DL
KETONES UR QL: NEGATIVE
LEUKOCYTE EST, POC: NEGATIVE
Lab: ABNORMAL
NITRITE UR-MCNC: NEGATIVE MG/ML
PH UR: 6 [PH] (ref 5–8)
PROT UR STRIP-MCNC: ABNORMAL MG/DL
RBC # UR STRIP: NEGATIVE /UL
SP GR UR: 1.01 (ref 1–1.03)
UROBILINOGEN UR QL: NORMAL

## 2024-01-29 PROCEDURE — 87086 URINE CULTURE/COLONY COUNT: CPT | Performed by: NURSE PRACTITIONER

## 2024-01-29 PROCEDURE — 81003 URINALYSIS AUTO W/O SCOPE: CPT | Performed by: NURSE PRACTITIONER

## 2024-01-29 PROCEDURE — 99214 OFFICE O/P EST MOD 30 MIN: CPT | Performed by: NURSE PRACTITIONER

## 2024-01-29 RX ORDER — MONTELUKAST SODIUM 10 MG/1
10 TABLET ORAL DAILY
COMMUNITY
Start: 2023-11-20

## 2024-01-29 NOTE — PROGRESS NOTES
"Chief Complaint  overactive bladder (NEW PATIENT WITH OAB/DETRUSOR INSTABILITY)    Subjective          Annette Galarza presents to [facility] for OAB/DI  History of Present Illness    Ms. Annette Galarza is a pleasant 68-year-old female who presents to clinic today for evaluation as a new patient consult. Patient has been referred to clinic by PCP with concerns of overactive bladder/detrusor instability complicated by her bouts of urine frequency, urgency, and incontinence.     On initial evaluation in clinic today, patient reports \"constantly running to the bathroom and sometimes does not make it\". She does not have recurrent UTIs and denies dysuria or any burning with urination. Her urinalysis today is completely negative for leukocyte esterase. It is negative for nitrite. It is negative for gross/microscopic hematuria. Her PVR is 0 mL.    The patient states she has been experiencing urinary frequency for 1 to 2 years, and it is getting worse. She drinks 5 to 6 sixteen ounce  bottles of water a day. She denies any urinary incontinence when she coughs or sneezes. The patient wears 3 pads a day. She denies any urinary tract infections or burning with urination. She has nocturia once nightly. When she runs water, she must urinate. The patient states she has had urodynamic testing performed in the past, and \"it don't work.\"     She had a complete hysterectomy in 1995. She denies any bladder surgeries. She denies having a bladder prolapse. The patient adds that she no longer has intercourse due to her 's issues, and her urinary frequency has increased since then. She wonders if intercourse in the past may have pushed her bladder higher.    The patient denies any issues with her bowels.    She had knee arthroplasty since her last visit. She also had bilateral cataracts removed and her right eye is still sore.    HER past medical history of diabetes, end-stage " "kidney disease, hypertension, hyperlipidemia, gout, GERD, asthma, arthritis, and diabetic peripheral neuropathy     Active Ambulatory Problems     Diagnosis Date Noted    Chronic obstructive pulmonary disease 07/22/2016    Fatigue 07/22/2016    Adiposity 07/22/2016    Chest pain 03/01/2018    Chest pain due to myocardial ischemia 02/28/2018    Right upper quadrant abdominal pain 03/28/2020     Resolved Ambulatory Problems     Diagnosis Date Noted    No Resolved Ambulatory Problems     Past Medical History:   Diagnosis Date    Arthritis     Asthma     Diabetic peripheral neuropathy     Esophageal reflux     Gout     Hyperlipidemia     Hypertension     Urinary incontinence       Objective   Vital Signs:   /91 (BP Location: Left arm, Patient Position: Sitting, Cuff Size: Adult)   Pulse 78   Ht 167.6 cm (66\")   Wt 103 kg (226 lb 3.2 oz)   BMI 36.51 kg/m²       ROS:   Review of Systems   Constitutional:  Positive for activity change, appetite change and unexpected weight gain. Negative for chills, diaphoresis, fatigue, fever and unexpected weight loss.   HENT:  Negative for congestion, ear discharge, ear pain, nosebleeds, rhinorrhea, sinus pressure and sore throat.    Eyes:  Negative for blurred vision, double vision, photophobia, pain, redness and visual disturbance.   Respiratory:  Negative for apnea, cough, chest tightness, shortness of breath, wheezing and stridor.    Cardiovascular:  Negative for chest pain and palpitations.   Gastrointestinal:  Positive for abdominal distention. Negative for abdominal pain, constipation, diarrhea, nausea and vomiting.   Endocrine: Negative for polydipsia, polyphagia and polyuria.   Genitourinary:  Positive for difficulty urinating, flank pain, frequency, pelvic pressure, urgency and urinary incontinence. Negative for decreased urine volume, dyspareunia, dysuria, genital sores, hematuria, pelvic pain and vaginal discharge.   Musculoskeletal:  Positive for back pain. " Negative for arthralgias and joint swelling.   Skin:  Positive for dry skin and pallor. Negative for rash and wound.   Neurological:  Negative for dizziness, tremors, syncope, weakness, light-headedness, headache, memory problem and confusion.   Psychiatric/Behavioral:  Negative for behavioral problems and decreased concentration.         Physical Exam  Constitutional:       General: She is in acute distress.      Appearance: She is well-developed. She is obese.   HENT:      Head: Normocephalic and atraumatic.   Eyes:      Pupils: Pupils are equal, round, and reactive to light.   Neck:      Thyroid: No thyromegaly.      Trachea: No tracheal deviation.   Cardiovascular:      Rate and Rhythm: Normal rate and regular rhythm.      Heart sounds: No murmur heard.  Pulmonary:      Effort: Pulmonary effort is normal. No respiratory distress.      Breath sounds: Normal breath sounds. No stridor. No wheezing.   Abdominal:      General: Bowel sounds are normal. There is distension.      Palpations: Abdomen is soft.      Tenderness: There is abdominal tenderness.   Genitourinary:     Labia:         Right: No tenderness.         Left: No tenderness.       Vagina: Normal. No vaginal discharge.      Comments: OAB/DI WITH URINE INCONTINENCE. Soft nontender abdomen with no organomegaly, rigidity, guarding or tenderness.  Normal vaginal orifice.  She has MODERATE leakage with Valsalva.  No significant perineal body abnormalities and a normal external anus.       Musculoskeletal:         General: No deformity. Normal range of motion.      Cervical back: Normal range of motion.   Skin:     General: Skin is warm and dry.      Capillary Refill: Capillary refill takes less than 2 seconds.      Coloration: Skin is pale.      Findings: No erythema or rash.   Neurological:      Mental Status: She is alert and oriented to person, place, and time.      Cranial Nerves: No cranial nerve deficit.      Sensory: No sensory deficit.      Motor:  Weakness present.      Coordination: Coordination normal.   Psychiatric:         Behavior: Behavior normal.         Thought Content: Thought content normal.         Judgment: Judgment normal.        Result Review :     UA          7/11/2023    13:45 1/10/2024    12:49 1/29/2024    13:31   Urinalysis   Squamous Epithelial Cells, UA 0-2  0-2     Specific Gravity, UA 1.014  1.013     Ketones, UA Negative  Negative  Negative    Blood, UA Negative  Negative     Leukocytes, UA Negative  Negative  Negative    Nitrite, UA Negative  Negative     RBC, UA 0-2  0-2     WBC, UA 0-2  0-2     Bacteria, UA None Seen  None Seen                Assessment and Plan    Problem List Items Addressed This Visit    None  Visit Diagnoses       Overactive bladder    -  Primary    Samples of Myrbetriq 50 mg given for 1 month trial.  Discussed urodynamic testing. Deferred per patient.  Consider cystoscopy if medication fails.    Relevant Medications    Mirabegron ER (Myrbetriq) 50 MG tablet sustained-release 24 hour 24 hr tablet    Frequency of micturition        Samples of Myrbetriq 50 mg given for 1 month trial.  Discussed urodynamic testing. Deferred per patient.  Consider cystoscopy if medication fails.    Relevant Medications    Mirabegron ER (Myrbetriq) 50 MG tablet sustained-release 24 hour 24 hr tablet    Other Relevant Orders    POC Urinalysis Dipstick, Automated (Completed)    Urine Culture - Urine, Urine, Random Void    Incomplete emptying of bladder        Samples of Myrbetriq 50 mg given for 1 month trial.  Discussed urodynamic testing. Deferred per patient.  Consider cystoscopy if medication fails.    Relevant Orders    Bladder Scan (Completed)    Detrusor instability of bladder        Samples of Myrbetriq 50 mg given for 1 month trial.  Discussed urodynamic testing. Deferred per patient.  Consider cystoscopy if medication fails.    Relevant Medications    Mirabegron ER (Myrbetriq) 50 MG tablet sustained-release 24 hour 24 hr  tablet    Mixed stress and urge urinary incontinence        Samples of Myrbetriq 50 mg given for 1 month trial.  Discussed urodynamic testing. Deferred per patient.  Consider cystoscopy if medication fails.    Relevant Medications    Mirabegron ER (Myrbetriq) 50 MG tablet sustained-release 24 hour 24 hr tablet           ASSESSMENT  Overactive Bladder/DI WITH Urinary Incontinence:      Ms. Annette Galarza is a pleasant 68-year-old female  patient evaluated in clinic today with mixed urinary incontinence symptoms that have been ongoing and now  now gradually becoming very bothersome to her.   She does not have recurrent UTIs, and denies any episodes of dysuria, burning on urination, or gross hematuria.  Her urine dipstick today is  completely negative for any infection, it is negative for gross/microscopic hematuria.  PVR is 0CC. We discussed treatable and non-treatable causes of both stress and urge urinary incontinence.     With regards to stress urinary incontinence we discussed its relationship to childbirth and pelvic health.  We discussed the grading of stress incontinence with trying to quantitate the number of pads used.  We talked about leaking urine with laughing, lifting, coughing, and sexual intercourse.      Talked about the Urge component and the concept of mixed incontinence where upon the stress is treatable, but the urge may exist and that 50% of the time the urge will resolve with treatment of the stress incontinence.  We talked with the diagnostic workup including a postvoid residual urine, OR even a simple cystometrogram.  I discussed the findings that may be neurologically related including commonly seen with multiple sclerosis, Parkinson's disease, and stroke.  I talked about the various therapeutic options including anticholinergics, beta 3 agonists, and alpha blockade if there is a component of obstruction.  I discussed the side effects of anti-cholinergic   including dry mouth, double  vision.    Again, we discussed detrusor instability which is an  irritative bladder symptomatology most likely related to factors such as intake of bladder irritants, postinfectious irritation, prolapse, with a very large differential diagnosis.  The mainstay of treatment has been tight cholinergics which basically caused the bladder to have decreased contractility.  We have discussed the side effects of these treatments including dry mouth, double vision, and increasing constipation.  She reports doing well  on oxybutynin for symptomatic relief.    Anticholinergic medication:  I talked about the various therapeutic options including anticholinergics, beta 3 agonists, and alpha blockade if there is a component of obstruction. I discussed the side effects of anti-cholinergic including dry mouth, double vision. HOWEVER, we are recommending the use of an anticholinergic. We discussed the class of drugs.  We discussed the older drug such as oxybutynin with his increased spectrum of side effects such as dry mouth, dry eyes constipation versus the newer medications with lower side effects but more difficult to obtain via insurance and much more expensive finally the newest class of drugs which is Myrbetriq which has a very favorable side effect profile but unfortunately is prohibitively expensive and oftentimes requires precertification of which may be unsuccessful in many other cases.  Patient reports failed therapy with oxybutynin from her PCP due to severe dry/cottonmouth.  She took medication for less than 2 weeks.  Was unable to tolerate it.        PLAN  We sent her urine for culture, due to concerns of frequency, urgency, pelvic pain and pressure.  I will call her with results if any positive bacterial growth.    Start Myrbetriq 50 mg daily.-Samples given to try X 1 month    Discussed lower tract investigation, DEFERRED, Patient wants to wait.-Try Myrbetriq first.    Discussed things she can do to help such as her  weight control, keeping a bladder diary with strict intake and output of what she eats or drinks, how often she urinates, how much she urinates.      She should follow a timed voiding bladder training program, such as limiting the amount of time between bathroom breaks, using the bathroom at regular intervals such as every 2-3 hours.  And using techniques to suppress bladder urges.      Also discussed pelvic floor muscle exercises, and do Keagle exercises to strengthen the muscles to help control urination.    We will follow-up in 4 weeks, Evaluate medication effectiveness.    Patient is Agreeable plan of care.    Patient reports that she is not currently experiencing any symptoms of urinary incontinence.    Class 2 Severe Obesity (BMI >=35 and <=39.9). Obesity-related health conditions include the following: obstructive sleep apnea, hypertension, diabetes mellitus, dyslipidemias, and GERD. Obesity is improving with lifestyle modifications. BMI is  36.51KG/M2 . We discussed portion control and increasing exercise.      RADIOLOGY (CT AND/OR KUB):    CT Abdomen and Pelvis: No results found for this or any previous visit.     CT Stone Protocol: No results found for this or any previous visit.     KUB: No results found for this or any previous visit.       LABS (3 MONTHS):    Office Visit on 01/29/2024   Component Date Value Ref Range Status    Color 01/29/2024 Yellow  Yellow, Straw, Dark Yellow, Teri Final    Clarity, UA 01/29/2024 Clear  Clear Final    Specific Gravity  01/29/2024 1.010  1.005 - 1.030 Final    pH, Urine 01/29/2024 6.0  5.0 - 8.0 Final    Leukocytes 01/29/2024 Negative  Negative Final    Nitrite, UA 01/29/2024 Negative  Negative Final    Protein, POC 01/29/2024 2+ (A)  Negative mg/dL Final    Glucose, UA 01/29/2024 Negative  Negative mg/dL Final    Ketones, UA 01/29/2024 Negative  Negative Final    Urobilinogen, UA 01/29/2024 Normal  Normal, 0.2 E.U./dL Final    Bilirubin 01/29/2024 Negative  Negative  Final    Blood, UA 01/29/2024 Negative  Negative Final    Lot Number 01/29/2024 [id number]   Final    Expiration Date 01/29/2024 10/25/24   Final   Lab on 01/10/2024   Component Date Value Ref Range Status    Glucose 01/10/2024 113 (H)  65 - 99 mg/dL Final    BUN 01/10/2024 26 (H)  8 - 23 mg/dL Final    Creatinine 01/10/2024 1.41 (H)  0.57 - 1.00 mg/dL Final    Sodium 01/10/2024 135 (L)  136 - 145 mmol/L Final    Potassium 01/10/2024 4.4  3.5 - 5.2 mmol/L Final    Chloride 01/10/2024 94 (L)  98 - 107 mmol/L Final    CO2 01/10/2024 25.8  22.0 - 29.0 mmol/L Final    Calcium 01/10/2024 10.1  8.6 - 10.5 mg/dL Final    Albumin 01/10/2024 4.5  3.5 - 5.2 g/dL Final    Phosphorus 01/10/2024 3.5  2.5 - 4.5 mg/dL Final    Anion Gap 01/10/2024 15.2 (H)  5.0 - 15.0 mmol/L Final    BUN/Creatinine Ratio 01/10/2024 18.4  7.0 - 25.0 Final    eGFR 01/10/2024 41.2 (L)  >60.0 mL/min/1.73 Final    Total Protein, Urine 01/10/2024 59.2  mg/dL Final    PTH, Intact 01/10/2024 45.0  15.0 - 65.0 pg/mL Final    Iron 01/10/2024 81  37 - 145 mcg/dL Final    Iron Saturation (TSAT) 01/10/2024 21  20 - 50 % Final    Transferrin 01/10/2024 257  200 - 360 mg/dL Final    TIBC 01/10/2024 383  298 - 536 mcg/dL Final    Creatinine, Urine 01/10/2024 52.8  mg/dL Final    25 Hydroxy, Vitamin D 01/10/2024 27.6 (L)  30.0 - 100.0 ng/ml Final    WBC 01/10/2024 11.48 (H)  3.40 - 10.80 10*3/mm3 Final    RBC 01/10/2024 3.97  3.77 - 5.28 10*6/mm3 Final    Hemoglobin 01/10/2024 11.9 (L)  12.0 - 15.9 g/dL Final    Hematocrit 01/10/2024 36.4  34.0 - 46.6 % Final    MCV 01/10/2024 91.7  79.0 - 97.0 fL Final    MCH 01/10/2024 30.0  26.6 - 33.0 pg Final    MCHC 01/10/2024 32.7  31.5 - 35.7 g/dL Final    RDW 01/10/2024 13.7  12.3 - 15.4 % Final    RDW-SD 01/10/2024 46.1  37.0 - 54.0 fl Final    MPV 01/10/2024 10.8  6.0 - 12.0 fL Final    Platelets 01/10/2024 321  140 - 450 10*3/mm3 Final    Neutrophil % 01/10/2024 67.4  42.7 - 76.0 % Final    Lymphocyte % 01/10/2024  21.2  19.6 - 45.3 % Final    Monocyte % 01/10/2024 7.1  5.0 - 12.0 % Final    Eosinophil % 01/10/2024 2.9  0.3 - 6.2 % Final    Basophil % 01/10/2024 0.5  0.0 - 1.5 % Final    Immature Grans % 01/10/2024 0.9 (H)  0.0 - 0.5 % Final    Neutrophils, Absolute 01/10/2024 7.75 (H)  1.70 - 7.00 10*3/mm3 Final    Lymphocytes, Absolute 01/10/2024 2.43  0.70 - 3.10 10*3/mm3 Final    Monocytes, Absolute 01/10/2024 0.81  0.10 - 0.90 10*3/mm3 Final    Eosinophils, Absolute 01/10/2024 0.33  0.00 - 0.40 10*3/mm3 Final    Basophils, Absolute 01/10/2024 0.06  0.00 - 0.20 10*3/mm3 Final    Immature Grans, Absolute 01/10/2024 0.10 (H)  0.00 - 0.05 10*3/mm3 Final    nRBC 01/10/2024 0.0  0.0 - 0.2 /100 WBC Final    Color, UA 01/10/2024 Yellow  Yellow, Straw Final    Appearance, UA 01/10/2024 Clear  Clear Final    pH, UA 01/10/2024 6.5  5.0 - 8.0 Final    Specific Gravity, UA 01/10/2024 1.013  1.005 - 1.030 Final    Glucose, UA 01/10/2024 Negative  Negative Final    Ketones, UA 01/10/2024 Negative  Negative Final    Bilirubin, UA 01/10/2024 Negative  Negative Final    Blood, UA 01/10/2024 Negative  Negative Final    Protein, UA 01/10/2024 100 mg/dL (2+) (A)  Negative Final    Leuk Esterase, UA 01/10/2024 Negative  Negative Final    Nitrite, UA 01/10/2024 Negative  Negative Final    Urobilinogen, UA 01/10/2024 0.2 E.U./dL  0.2 - 1.0 E.U./dL Final    RBC, UA 01/10/2024 0-2  None Seen, 0-2 /HPF Final    WBC, UA 01/10/2024 0-2  None Seen, 0-2 /HPF Final    Bacteria, UA 01/10/2024 None Seen  None Seen /HPF Final    Squamous Epithelial Cells, UA 01/10/2024 0-2  None Seen, 0-2 /HPF Final    Hyaline Casts, UA 01/10/2024 None Seen  None Seen /LPF Final    Methodology 01/10/2024 Automated Microscopy   Final          Smoking Cessation Counseling:  Former smoker.QUIT 2007  Patient does not currently use any tobacco products.       Follow Up   Return in about 1 month (around 2/29/2024) for Next scheduled follow up, ACUTE CYSTITIS/OVERAVTIVE  BLADDER/I/DYSURIA/DETRUSSOR INSTABILITY-EVAL MED.    Patient was given instructions and counseling regarding her condition or for health maintenance advice. Please see specific information pulled into the AVS if appropriate.          This document has been electronically signed by Griselda Cheng-Akwa, APRN   January 29, 2024 15:10 EST      Dictated Utilizing Dragon Dictation: Part of this note may be an electronic transcription/translation of spoken language to printed text using the Dragon Dictation System.    Transcribed from ambient dictation for Griselda Cheng-Akwa, APRN by Liliana Alejo.  01/29/24   14:50 EST    Patient or patient representative verbalized consent to the visit recording.  I have personally performed the services described in this document as transcribed by the above individual, and it is both accurate and complete.

## 2024-01-30 LAB — BACTERIA SPEC AEROBE CULT: NO GROWTH

## 2024-01-31 ENCOUNTER — TELEPHONE (OUTPATIENT)
Dept: UROLOGY | Facility: CLINIC | Age: 69
End: 2024-01-31
Payer: MEDICARE

## 2024-01-31 NOTE — TELEPHONE ENCOUNTER
----- Message from Griselda Cheng-Akwa, APRN sent at 1/31/2024  8:27 AM EST -----  Please kindly let patient know her urine culture results are negative for any bacterial infection at this time.    Urine Culture - No growth  However she may drop off another urine sample if she feels symptomatic, if not follow-up in clinic as discussed.    Thank you

## 2024-02-01 ENCOUNTER — PATIENT ROUNDING (BHMG ONLY) (OUTPATIENT)
Dept: UROLOGY | Facility: CLINIC | Age: 69
End: 2024-02-01
Payer: MEDICARE

## 2024-02-05 ENCOUNTER — HOSPITAL ENCOUNTER (OUTPATIENT)
Dept: RESPIRATORY THERAPY | Facility: HOSPITAL | Age: 69
Discharge: HOME | End: 2024-02-05
Admitting: INTERNAL MEDICINE
Payer: MEDICARE

## 2024-02-05 VITALS — OXYGEN SATURATION: 90 % | HEART RATE: 81 BPM | RESPIRATION RATE: 16 BRPM

## 2024-02-05 DIAGNOSIS — R06.02 SOB (SHORTNESS OF BREATH): ICD-10-CM

## 2024-02-05 PROCEDURE — 94799 UNLISTED PULMONARY SVC/PX: CPT

## 2024-02-05 PROCEDURE — 94729 DIFFUSING CAPACITY: CPT

## 2024-02-05 PROCEDURE — 94640 AIRWAY INHALATION TREATMENT: CPT

## 2024-02-05 PROCEDURE — 94060 EVALUATION OF WHEEZING: CPT

## 2024-02-05 PROCEDURE — 94729 DIFFUSING CAPACITY: CPT | Performed by: INTERNAL MEDICINE

## 2024-02-05 PROCEDURE — 94726 PLETHYSMOGRAPHY LUNG VOLUMES: CPT | Performed by: INTERNAL MEDICINE

## 2024-02-05 PROCEDURE — 94760 N-INVAS EAR/PLS OXIMETRY 1: CPT

## 2024-02-05 PROCEDURE — 94060 EVALUATION OF WHEEZING: CPT | Performed by: INTERNAL MEDICINE

## 2024-02-05 PROCEDURE — 94726 PLETHYSMOGRAPHY LUNG VOLUMES: CPT

## 2024-02-05 RX ORDER — ALBUTEROL SULFATE 2.5 MG/3ML
2.5 SOLUTION RESPIRATORY (INHALATION) ONCE
Status: COMPLETED | OUTPATIENT
Start: 2024-02-05 | End: 2024-02-05

## 2024-02-05 RX ADMIN — ALBUTEROL SULFATE 2.5 MG: 2.5 SOLUTION RESPIRATORY (INHALATION) at 14:09

## 2024-02-28 ENCOUNTER — TELEPHONE (OUTPATIENT)
Dept: UROLOGY | Facility: CLINIC | Age: 69
End: 2024-02-28
Payer: MEDICARE

## 2024-02-28 DIAGNOSIS — N32.81 OVERACTIVE BLADDER: ICD-10-CM

## 2024-02-28 DIAGNOSIS — N32.81 DETRUSOR INSTABILITY OF BLADDER: ICD-10-CM

## 2024-02-28 DIAGNOSIS — N39.46 MIXED STRESS AND URGE URINARY INCONTINENCE: ICD-10-CM

## 2024-02-28 DIAGNOSIS — R35.0 FREQUENCY OF MICTURITION: ICD-10-CM

## 2024-02-28 NOTE — TELEPHONE ENCOUNTER
Caller: Annette Galarza     Relationship: SELF    Best call back number: [phone]     Requested Prescriptions: Mirabegron ER (Myrbetriq) 50 MG tablet sustained-release 24 hour 24 hr tablet   Requested Prescriptions      No prescriptions requested or ordered in this encounter        Pharmacy where request should be sent:  [facility]  [place], [place] - [address] [place] [address] [phone] [facility] [phone] [facility]     Last office visit with prescribing clinician: 1/29/2024   Last telemedicine visit with prescribing clinician: Visit date not found   Next office visit with prescribing clinician: 3/15/2024     Additional details provided by patient: PT TOOK THE LAST ONE THIS MORNING    Does the patient have less than a 3 day supply:  [x] Yes  [] No    Would you like a call back once the refill request has been completed: [] Yes [x] No    If the office needs to give you a call back, can they leave a voicemail: [] Yes [x] No    Stella Donovan Rep   02/28/24 12:02 EST

## 2024-02-29 ENCOUNTER — TELEPHONE (OUTPATIENT)
Dept: UROLOGY | Facility: CLINIC | Age: 69
End: 2024-02-29
Payer: MEDICARE

## 2024-02-29 DIAGNOSIS — N32.81 DETRUSOR INSTABILITY OF BLADDER: ICD-10-CM

## 2024-02-29 DIAGNOSIS — N32.81 OVERACTIVE BLADDER: ICD-10-CM

## 2024-02-29 DIAGNOSIS — N39.46 MIXED STRESS AND URGE URINARY INCONTINENCE: ICD-10-CM

## 2024-02-29 DIAGNOSIS — R35.0 FREQUENCY OF MICTURITION: ICD-10-CM

## 2024-02-29 NOTE — TELEPHONE ENCOUNTER
[facility] in [place] told patient they have not received the prescription for myrbetriq 50mg. Can that be resent

## 2024-04-11 ENCOUNTER — OFFICE VISIT (OUTPATIENT)
Dept: PULMONOLOGY | Facility: CLINIC | Age: 69
End: 2024-04-11
Payer: MEDICARE

## 2024-04-11 VITALS
HEART RATE: 86 BPM | TEMPERATURE: 97.5 F | WEIGHT: 219 LBS | OXYGEN SATURATION: 96 % | BODY MASS INDEX: 35.2 KG/M2 | SYSTOLIC BLOOD PRESSURE: 128 MMHG | HEIGHT: 66 IN | DIASTOLIC BLOOD PRESSURE: 68 MMHG

## 2024-04-11 DIAGNOSIS — Z87.891 FORMER SMOKER: ICD-10-CM

## 2024-04-11 DIAGNOSIS — D50.9 IRON DEFICIENCY ANEMIA, UNSPECIFIED IRON DEFICIENCY ANEMIA TYPE: ICD-10-CM

## 2024-04-11 DIAGNOSIS — G47.33 OSA (OBSTRUCTIVE SLEEP APNEA): Primary | ICD-10-CM

## 2024-04-11 PROCEDURE — 1159F MED LIST DOCD IN RCRD: CPT | Performed by: PHYSICIAN ASSISTANT

## 2024-04-11 PROCEDURE — 99214 OFFICE O/P EST MOD 30 MIN: CPT | Performed by: PHYSICIAN ASSISTANT

## 2024-04-11 PROCEDURE — 1160F RVW MEDS BY RX/DR IN RCRD: CPT | Performed by: PHYSICIAN ASSISTANT

## 2024-04-11 RX ORDER — DULOXETIN HYDROCHLORIDE 60 MG/1
CAPSULE, DELAYED RELEASE ORAL
COMMUNITY
Start: 2024-02-28

## 2024-04-11 RX ORDER — OLMESARTAN MEDOXOMIL AND HYDROCHLOROTHIAZIDE 40/12.5 40; 12.5 MG/1; MG/1
TABLET ORAL
COMMUNITY
Start: 2024-02-12

## 2024-04-11 RX ORDER — SEMAGLUTIDE 0.68 MG/ML
INJECTION, SOLUTION SUBCUTANEOUS
COMMUNITY
Start: 2024-02-07

## 2024-04-11 RX ORDER — DILTIAZEM HYDROCHLORIDE 120 MG/1
120 CAPSULE, COATED, EXTENDED RELEASE ORAL DAILY
COMMUNITY
Start: 2024-02-01 | End: 2025-01-31

## 2024-04-11 NOTE — PROGRESS NOTES
"Chief Complaint  James/nocturnal hypoxia      Subjective        Annette Galarza presents to [facility]  History of Present Illness    Patient presents today for follow-up.  Previously diagnosed with sleep apnea.  States around that time, she was also notable for significant iron deficiency anemia and required significant iron replacement amounts. Now that improvement of anemia has been noted, she states that shortness of breath has significantly improved.   Denies any known sleep difficulties. Was diagnosed with sleep apnea in the past but did not use the device.   During the most recent visit, order was placed for overnight oxygen supplies but never received them.   Smoking history noted but was able to achieve cessation in 2007 (> 15 years ago).   No other concerning symptoms per patient at this time. No hemoptysis, unexpected weight loss, fevers.       Objective   Vital Signs:  /68   Pulse 86   Temp 97.5 °F (36.4 °C)   Ht 167.6 cm (66\")   Wt 99.3 kg (219 lb)   SpO2 96%   BMI 35.35 kg/m²   Estimated body mass index is 35.35 kg/m² as calculated from the following:    Height as of this encounter: 167.6 cm (66\").    Weight as of this encounter: 99.3 kg (219 lb).          Physical Exam   Result Review :    The following data was reviewed by: Becki Nye PA-C on 04/11/2024:  Home sleep study (2022) - notable for mild sleep apnea. Hypoxia noted during testing, average of 87%.     PFT (2024)  Spirometry suggest restriction with no significant bronchodilator effect seen on this occasion.  Diffusion capacity is normal.  Lung volumes shows mild restrictive lung disease with  significant air trapping.  Flow volume loop is restricted.        Low-dose CT chest report 2015 -no significant findings    Chest x-ray report 2022 - \"no acute cardiopulmonary findings\"      Assessment and Plan     Diagnoses and all orders for this visit:    1. JAMES (obstructive " sleep apnea) (Primary)    2. Iron deficiency anemia, unspecified iron deficiency anemia type    3. Former smoker          Obstructive sleep apnea, iron deficiency anemia:  Had notable respiratory symptoms prior to iron replacement.  Interval resolution of symptoms noted after improvement of labs.  Supplies likely not received due to insurance qualification per previous available test.  Would likely have to repeat overnight pulse oximetry testing.  Discussed that BRITTANY can persist or resolve over time.  Discussed health risks associated with untreated BRITTANY.   Does not wish to use AutoPap device.  In order to receive overnight oxygen supplies, will have to repeat overnight pulse oximetry test  Prefers to avoid overnight pulse oximetry test at this time.      Denies any significant shortness of breath at this time.  PFT shows only minimal air trapping, and notable for airway resistance.  Prefers to await any new medications at this time  We will reconsider if symptoms progress.      Former smoker:  Does not qualify for low-dose CT screenings due to greater than 15 years since cessation.        She will contact us on an as-needed basis if symptoms change.      Follow Up     Return if symptoms worsen or fail to improve.  Patient was given instructions and counseling regarding her condition or for health maintenance advice. Please see specific information pulled into the AVS if appropriate.

## 2024-05-14 ENCOUNTER — TRANSCRIBE ORDERS (OUTPATIENT)
Dept: ADMINISTRATIVE | Facility: HOSPITAL | Age: 69
End: 2024-05-14
Payer: MEDICARE

## 2024-05-14 DIAGNOSIS — Z12.31 VISIT FOR SCREENING MAMMOGRAM: Primary | ICD-10-CM

## 2024-05-24 ENCOUNTER — HOSPITAL ENCOUNTER (OUTPATIENT)
Dept: MAMMOGRAPHY | Facility: HOSPITAL | Age: 69
Discharge: HOME | End: 2024-05-24
Admitting: PHYSICIAN ASSISTANT
Payer: MEDICARE

## 2024-05-24 DIAGNOSIS — Z12.31 VISIT FOR SCREENING MAMMOGRAM: ICD-10-CM

## 2024-05-24 PROCEDURE — 77067 SCR MAMMO BI INCL CAD: CPT

## 2024-05-24 PROCEDURE — 77063 BREAST TOMOSYNTHESIS BI: CPT

## 2024-06-03 ENCOUNTER — TRANSCRIBE ORDERS (OUTPATIENT)
Dept: ADMINISTRATIVE | Facility: HOSPITAL | Age: 69
End: 2024-06-03
Payer: MEDICARE

## 2024-06-03 ENCOUNTER — LAB (OUTPATIENT)
Dept: LAB | Facility: HOSPITAL | Age: 69
End: 2024-06-03
Payer: MEDICARE

## 2024-06-03 DIAGNOSIS — N18.30 STAGE 3 CHRONIC KIDNEY DISEASE, UNSPECIFIED WHETHER STAGE 3A OR 3B CKD: Primary | ICD-10-CM

## 2024-06-03 DIAGNOSIS — N18.30 STAGE 3 CHRONIC KIDNEY DISEASE, UNSPECIFIED WHETHER STAGE 3A OR 3B CKD: ICD-10-CM

## 2024-06-03 LAB
BASOPHILS # BLD AUTO: 0.06 10*3/MM3 (ref 0–0.2)
BASOPHILS NFR BLD AUTO: 0.4 % (ref 0–1.5)
DEPRECATED RDW RBC AUTO: 45.1 FL (ref 37–54)
EOSINOPHIL # BLD AUTO: 0.5 10*3/MM3 (ref 0–0.4)
EOSINOPHIL NFR BLD AUTO: 3.7 % (ref 0.3–6.2)
ERYTHROCYTE [DISTWIDTH] IN BLOOD BY AUTOMATED COUNT: 13.2 % (ref 12.3–15.4)
HCT VFR BLD AUTO: 36.7 % (ref 34–46.6)
HGB BLD-MCNC: 11.5 G/DL (ref 12–15.9)
IMM GRANULOCYTES # BLD AUTO: 0.08 10*3/MM3 (ref 0–0.05)
IMM GRANULOCYTES NFR BLD AUTO: 0.6 % (ref 0–0.5)
LYMPHOCYTES # BLD AUTO: 2.98 10*3/MM3 (ref 0.7–3.1)
LYMPHOCYTES NFR BLD AUTO: 22.1 % (ref 19.6–45.3)
MCH RBC QN AUTO: 29.5 PG (ref 26.6–33)
MCHC RBC AUTO-ENTMCNC: 31.3 G/DL (ref 31.5–35.7)
MCV RBC AUTO: 94.1 FL (ref 79–97)
MONOCYTES # BLD AUTO: 0.93 10*3/MM3 (ref 0.1–0.9)
MONOCYTES NFR BLD AUTO: 6.9 % (ref 5–12)
NEUTROPHILS NFR BLD AUTO: 66.3 % (ref 42.7–76)
NEUTROPHILS NFR BLD AUTO: 8.95 10*3/MM3 (ref 1.7–7)
NRBC BLD AUTO-RTO: 0 /100 WBC (ref 0–0.2)
PLATELET # BLD AUTO: 341 10*3/MM3 (ref 140–450)
PMV BLD AUTO: 10.1 FL (ref 6–12)
RBC # BLD AUTO: 3.9 10*6/MM3 (ref 3.77–5.28)
WBC NRBC COR # BLD AUTO: 13.5 10*3/MM3 (ref 3.4–10.8)

## 2024-06-03 PROCEDURE — 82570 ASSAY OF URINE CREATININE: CPT

## 2024-06-03 PROCEDURE — 36415 COLL VENOUS BLD VENIPUNCTURE: CPT

## 2024-06-03 PROCEDURE — 80069 RENAL FUNCTION PANEL: CPT

## 2024-06-03 PROCEDURE — 84466 ASSAY OF TRANSFERRIN: CPT

## 2024-06-03 PROCEDURE — 85025 COMPLETE CBC W/AUTO DIFF WBC: CPT

## 2024-06-03 PROCEDURE — 82728 ASSAY OF FERRITIN: CPT

## 2024-06-03 PROCEDURE — 83540 ASSAY OF IRON: CPT

## 2024-06-03 PROCEDURE — 84156 ASSAY OF PROTEIN URINE: CPT

## 2024-06-03 PROCEDURE — 81001 URINALYSIS AUTO W/SCOPE: CPT

## 2024-06-04 LAB
ALBUMIN SERPL-MCNC: 4.3 G/DL (ref 3.5–5.2)
ANION GAP SERPL CALCULATED.3IONS-SCNC: 14.7 MMOL/L (ref 5–15)
BACTERIA UR QL AUTO: NORMAL /HPF
BILIRUB UR QL STRIP: NEGATIVE
BUN SERPL-MCNC: 24 MG/DL (ref 8–23)
BUN/CREAT SERPL: 16.4 (ref 7–25)
CALCIUM SPEC-SCNC: 10 MG/DL (ref 8.6–10.5)
CHLORIDE SERPL-SCNC: 95 MMOL/L (ref 98–107)
CLARITY UR: CLEAR
CO2 SERPL-SCNC: 23.3 MMOL/L (ref 22–29)
COLOR UR: YELLOW
CREAT SERPL-MCNC: 1.46 MG/DL (ref 0.57–1)
CREAT UR-MCNC: 64 MG/DL
EGFRCR SERPLBLD CKD-EPI 2021: 39.3 ML/MIN/1.73
FERRITIN SERPL-MCNC: 261 NG/ML (ref 13–150)
GLUCOSE SERPL-MCNC: 107 MG/DL (ref 65–99)
GLUCOSE UR STRIP-MCNC: NEGATIVE MG/DL
HGB UR QL STRIP.AUTO: NEGATIVE
HYALINE CASTS UR QL AUTO: NORMAL /LPF
IRON 24H UR-MRATE: 58 MCG/DL (ref 37–145)
IRON SATN MFR SERPL: 15 % (ref 20–50)
KETONES UR QL STRIP: NEGATIVE
LEUKOCYTE ESTERASE UR QL STRIP.AUTO: NEGATIVE
NITRITE UR QL STRIP: NEGATIVE
PH UR STRIP.AUTO: 6.5 [PH] (ref 5–8)
PHOSPHATE SERPL-MCNC: 4 MG/DL (ref 2.5–4.5)
POTASSIUM SERPL-SCNC: 4.6 MMOL/L (ref 3.5–5.2)
PROT ?TM UR-MCNC: 38.3 MG/DL
PROT UR QL STRIP: ABNORMAL
RBC # UR STRIP: NORMAL /HPF
REF LAB TEST METHOD: NORMAL
SODIUM SERPL-SCNC: 133 MMOL/L (ref 136–145)
SP GR UR STRIP: 1.01 (ref 1–1.03)
SQUAMOUS #/AREA URNS HPF: NORMAL /HPF
TIBC SERPL-MCNC: 384 MCG/DL (ref 298–536)
TRANSFERRIN SERPL-MCNC: 258 MG/DL (ref 200–360)
UROBILINOGEN UR QL STRIP: ABNORMAL
WBC # UR STRIP: NORMAL /HPF

## 2024-06-12 ENCOUNTER — HOSPITAL ENCOUNTER (OUTPATIENT)
Dept: MAMMOGRAPHY | Facility: HOSPITAL | Age: 69
Discharge: HOME | End: 2024-06-12
Admitting: RADIOLOGY
Payer: MEDICARE

## 2024-06-12 DIAGNOSIS — R92.8 ABNORMAL MAMMOGRAM OF LEFT BREAST: ICD-10-CM

## 2024-06-12 PROCEDURE — G0279 TOMOSYNTHESIS, MAMMO: HCPCS

## 2024-06-12 PROCEDURE — 77065 DX MAMMO INCL CAD UNI: CPT

## 2024-08-07 DIAGNOSIS — I25.10 ASCVD (ARTERIOSCLEROTIC CARDIOVASCULAR DISEASE): ICD-10-CM

## 2024-08-07 DIAGNOSIS — E78.5 HYPERLIPIDEMIA, UNSPECIFIED HYPERLIPIDEMIA TYPE: Primary | ICD-10-CM

## 2024-08-26 ENCOUNTER — TELEPHONE (OUTPATIENT)
Dept: PHARMACY | Facility: HOSPITAL | Age: 69
End: 2024-08-26
Payer: MEDICARE

## 2024-08-26 NOTE — TELEPHONE ENCOUNTER
Leqvio was approved on patients Part D insurance but the cost is $839.12 and without a yanely to help the patient with that copay at this time, I attempted to get her approved for [employer] Patient Assistance. When the patient was contact by the [employer] company to get income information so that she could qualify for the free medication program, the patient stated that she was not interested in their program or Leqvio at this time. I did let the patient know when she was ready to start cholesterol injections to let me know.   Please contact: Brea [phone] at the [facility] [place] clinic

## 2024-10-02 ENCOUNTER — LAB (OUTPATIENT)
Dept: LAB | Facility: HOSPITAL | Age: 69
End: 2024-10-02
Payer: MEDICARE

## 2024-10-02 ENCOUNTER — TRANSCRIBE ORDERS (OUTPATIENT)
Dept: ADMINISTRATIVE | Facility: HOSPITAL | Age: 69
End: 2024-10-02
Payer: MEDICARE

## 2024-10-02 DIAGNOSIS — M10.9 GOUT, UNSPECIFIED CAUSE, UNSPECIFIED CHRONICITY, UNSPECIFIED SITE: ICD-10-CM

## 2024-10-02 DIAGNOSIS — N18.30 STAGE 3 CHRONIC KIDNEY DISEASE, UNSPECIFIED WHETHER STAGE 3A OR 3B CKD: ICD-10-CM

## 2024-10-02 DIAGNOSIS — N18.30 STAGE 3 CHRONIC KIDNEY DISEASE, UNSPECIFIED WHETHER STAGE 3A OR 3B CKD: Primary | ICD-10-CM

## 2024-10-02 LAB
BASOPHILS # BLD AUTO: 0.08 10*3/MM3 (ref 0–0.2)
BASOPHILS NFR BLD AUTO: 0.8 % (ref 0–1.5)
DEPRECATED RDW RBC AUTO: 43.7 FL (ref 37–54)
EOSINOPHIL # BLD AUTO: 0.31 10*3/MM3 (ref 0–0.4)
EOSINOPHIL NFR BLD AUTO: 2.9 % (ref 0.3–6.2)
ERYTHROCYTE [DISTWIDTH] IN BLOOD BY AUTOMATED COUNT: 13 % (ref 12.3–15.4)
HCT VFR BLD AUTO: 38.1 % (ref 34–46.6)
HGB BLD-MCNC: 11.7 G/DL (ref 12–15.9)
IMM GRANULOCYTES # BLD AUTO: 0.05 10*3/MM3 (ref 0–0.05)
IMM GRANULOCYTES NFR BLD AUTO: 0.5 % (ref 0–0.5)
LYMPHOCYTES # BLD AUTO: 2.61 10*3/MM3 (ref 0.7–3.1)
LYMPHOCYTES NFR BLD AUTO: 24.6 % (ref 19.6–45.3)
MCH RBC QN AUTO: 28.2 PG (ref 26.6–33)
MCHC RBC AUTO-ENTMCNC: 30.7 G/DL (ref 31.5–35.7)
MCV RBC AUTO: 91.8 FL (ref 79–97)
MONOCYTES # BLD AUTO: 0.74 10*3/MM3 (ref 0.1–0.9)
MONOCYTES NFR BLD AUTO: 7 % (ref 5–12)
NEUTROPHILS NFR BLD AUTO: 6.83 10*3/MM3 (ref 1.7–7)
NEUTROPHILS NFR BLD AUTO: 64.2 % (ref 42.7–76)
NRBC BLD AUTO-RTO: 0 /100 WBC (ref 0–0.2)
PLATELET # BLD AUTO: 364 10*3/MM3 (ref 140–450)
PMV BLD AUTO: 9.9 FL (ref 6–12)
RBC # BLD AUTO: 4.15 10*6/MM3 (ref 3.77–5.28)
WBC NRBC COR # BLD AUTO: 10.62 10*3/MM3 (ref 3.4–10.8)

## 2024-10-02 PROCEDURE — 85025 COMPLETE CBC W/AUTO DIFF WBC: CPT

## 2024-10-02 PROCEDURE — 80069 RENAL FUNCTION PANEL: CPT

## 2024-10-02 PROCEDURE — 84156 ASSAY OF PROTEIN URINE: CPT

## 2024-10-02 PROCEDURE — 84550 ASSAY OF BLOOD/URIC ACID: CPT

## 2024-10-02 PROCEDURE — 36415 COLL VENOUS BLD VENIPUNCTURE: CPT

## 2024-10-02 PROCEDURE — 81001 URINALYSIS AUTO W/SCOPE: CPT

## 2024-10-02 PROCEDURE — 82570 ASSAY OF URINE CREATININE: CPT

## 2024-10-03 LAB
ALBUMIN SERPL-MCNC: 4.5 G/DL (ref 3.5–5.2)
ANION GAP SERPL CALCULATED.3IONS-SCNC: 12 MMOL/L (ref 5–15)
BACTERIA UR QL AUTO: ABNORMAL /HPF
BILIRUB UR QL STRIP: NEGATIVE
BUN SERPL-MCNC: 27 MG/DL (ref 8–23)
BUN/CREAT SERPL: 21.3 (ref 7–25)
CALCIUM SPEC-SCNC: 10.3 MG/DL (ref 8.6–10.5)
CHLORIDE SERPL-SCNC: 98 MMOL/L (ref 98–107)
CLARITY UR: CLEAR
CO2 SERPL-SCNC: 27 MMOL/L (ref 22–29)
COLOR UR: YELLOW
CREAT SERPL-MCNC: 1.27 MG/DL (ref 0.57–1)
CREAT UR-MCNC: 30 MG/DL
EGFRCR SERPLBLD CKD-EPI 2021: 46.4 ML/MIN/1.73
GLUCOSE SERPL-MCNC: 95 MG/DL (ref 65–99)
GLUCOSE UR STRIP-MCNC: NEGATIVE MG/DL
HGB UR QL STRIP.AUTO: NEGATIVE
HYALINE CASTS UR QL AUTO: ABNORMAL /LPF
KETONES UR QL STRIP: NEGATIVE
LEUKOCYTE ESTERASE UR QL STRIP.AUTO: NEGATIVE
NITRITE UR QL STRIP: NEGATIVE
PH UR STRIP.AUTO: 6.5 [PH] (ref 5–8)
PHOSPHATE SERPL-MCNC: 3.8 MG/DL (ref 2.5–4.5)
POTASSIUM SERPL-SCNC: 4.7 MMOL/L (ref 3.5–5.2)
PROT ?TM UR-MCNC: 34.7 MG/DL
PROT UR QL STRIP: ABNORMAL
RBC # UR STRIP: ABNORMAL /HPF
REF LAB TEST METHOD: ABNORMAL
SODIUM SERPL-SCNC: 137 MMOL/L (ref 136–145)
SP GR UR STRIP: 1.01 (ref 1–1.03)
SQUAMOUS #/AREA URNS HPF: ABNORMAL /HPF
URATE SERPL-MCNC: 6.9 MG/DL (ref 2.4–5.7)
UROBILINOGEN UR QL STRIP: ABNORMAL
WBC # UR STRIP: ABNORMAL /HPF

## 2025-01-29 ENCOUNTER — LAB (OUTPATIENT)
Dept: LAB | Facility: HOSPITAL | Age: 70
End: 2025-01-29
Payer: MEDICARE

## 2025-01-29 ENCOUNTER — TRANSCRIBE ORDERS (OUTPATIENT)
Dept: ADMINISTRATIVE | Facility: HOSPITAL | Age: 70
End: 2025-01-29
Payer: MEDICARE

## 2025-01-29 DIAGNOSIS — N18.30 STAGE 3 CHRONIC KIDNEY DISEASE, UNSPECIFIED WHETHER STAGE 3A OR 3B CKD: Primary | ICD-10-CM

## 2025-01-29 DIAGNOSIS — N18.30 STAGE 3 CHRONIC KIDNEY DISEASE, UNSPECIFIED WHETHER STAGE 3A OR 3B CKD: ICD-10-CM

## 2025-01-29 PROCEDURE — 80069 RENAL FUNCTION PANEL: CPT

## 2025-01-29 PROCEDURE — 84156 ASSAY OF PROTEIN URINE: CPT

## 2025-01-29 PROCEDURE — 36415 COLL VENOUS BLD VENIPUNCTURE: CPT

## 2025-01-29 PROCEDURE — 82570 ASSAY OF URINE CREATININE: CPT

## 2025-01-29 PROCEDURE — 81001 URINALYSIS AUTO W/SCOPE: CPT

## 2025-01-29 PROCEDURE — 85025 COMPLETE CBC W/AUTO DIFF WBC: CPT

## 2025-01-30 LAB
ALBUMIN SERPL-MCNC: 3.9 G/DL (ref 3.5–5.2)
ANION GAP SERPL CALCULATED.3IONS-SCNC: 9.4 MMOL/L (ref 5–15)
BACTERIA UR QL AUTO: NORMAL /HPF
BASOPHILS # BLD AUTO: 0.06 10*3/MM3 (ref 0–0.2)
BASOPHILS NFR BLD AUTO: 0.6 % (ref 0–1.5)
BILIRUB UR QL STRIP: NEGATIVE
BUN SERPL-MCNC: 27 MG/DL (ref 8–23)
BUN/CREAT SERPL: 15.4 (ref 7–25)
CALCIUM SPEC-SCNC: 9.6 MG/DL (ref 8.6–10.5)
CHLORIDE SERPL-SCNC: 96 MMOL/L (ref 98–107)
CLARITY UR: CLEAR
CO2 SERPL-SCNC: 27.6 MMOL/L (ref 22–29)
COLOR UR: YELLOW
CREAT SERPL-MCNC: 1.75 MG/DL (ref 0.57–1)
CREAT UR-MCNC: 71.3 MG/DL
DEPRECATED RDW RBC AUTO: 43.9 FL (ref 37–54)
EGFRCR SERPLBLD CKD-EPI 2021: 31.6 ML/MIN/1.73
EOSINOPHIL # BLD AUTO: 0.33 10*3/MM3 (ref 0–0.4)
EOSINOPHIL NFR BLD AUTO: 3.2 % (ref 0.3–6.2)
ERYTHROCYTE [DISTWIDTH] IN BLOOD BY AUTOMATED COUNT: 13.6 % (ref 12.3–15.4)
GLUCOSE SERPL-MCNC: 116 MG/DL (ref 65–99)
GLUCOSE UR STRIP-MCNC: NEGATIVE MG/DL
HCT VFR BLD AUTO: 34.1 % (ref 34–46.6)
HGB BLD-MCNC: 10.9 G/DL (ref 12–15.9)
HGB UR QL STRIP.AUTO: NEGATIVE
HYALINE CASTS UR QL AUTO: NORMAL /LPF
IMM GRANULOCYTES # BLD AUTO: 0.05 10*3/MM3 (ref 0–0.05)
IMM GRANULOCYTES NFR BLD AUTO: 0.5 % (ref 0–0.5)
KETONES UR QL STRIP: NEGATIVE
LEUKOCYTE ESTERASE UR QL STRIP.AUTO: NEGATIVE
LYMPHOCYTES # BLD AUTO: 2.52 10*3/MM3 (ref 0.7–3.1)
LYMPHOCYTES NFR BLD AUTO: 24.3 % (ref 19.6–45.3)
MCH RBC QN AUTO: 28.8 PG (ref 26.6–33)
MCHC RBC AUTO-ENTMCNC: 32 G/DL (ref 31.5–35.7)
MCV RBC AUTO: 90 FL (ref 79–97)
MONOCYTES # BLD AUTO: 0.87 10*3/MM3 (ref 0.1–0.9)
MONOCYTES NFR BLD AUTO: 8.4 % (ref 5–12)
NEUTROPHILS NFR BLD AUTO: 6.55 10*3/MM3 (ref 1.7–7)
NEUTROPHILS NFR BLD AUTO: 63 % (ref 42.7–76)
NITRITE UR QL STRIP: NEGATIVE
NRBC BLD AUTO-RTO: 0 /100 WBC (ref 0–0.2)
PH UR STRIP.AUTO: 6 [PH] (ref 5–8)
PHOSPHATE SERPL-MCNC: 3.7 MG/DL (ref 2.5–4.5)
PLATELET # BLD AUTO: 282 10*3/MM3 (ref 140–450)
PMV BLD AUTO: 10 FL (ref 6–12)
POTASSIUM SERPL-SCNC: 4.5 MMOL/L (ref 3.5–5.2)
PROT ?TM UR-MCNC: 23.7 MG/DL
PROT UR QL STRIP: ABNORMAL
RBC # BLD AUTO: 3.79 10*6/MM3 (ref 3.77–5.28)
RBC # UR STRIP: NORMAL /HPF
REF LAB TEST METHOD: NORMAL
SODIUM SERPL-SCNC: 133 MMOL/L (ref 136–145)
SP GR UR STRIP: 1.01 (ref 1–1.03)
SQUAMOUS #/AREA URNS HPF: NORMAL /HPF
UROBILINOGEN UR QL STRIP: ABNORMAL
WBC # UR STRIP: NORMAL /HPF
WBC NRBC COR # BLD AUTO: 10.38 10*3/MM3 (ref 3.4–10.8)

## 2025-02-25 ENCOUNTER — TRANSCRIBE ORDERS (OUTPATIENT)
Dept: GENERAL RADIOLOGY | Facility: HOSPITAL | Age: 70
End: 2025-02-25
Payer: MEDICARE

## 2025-02-25 ENCOUNTER — LAB (OUTPATIENT)
Dept: LAB | Facility: HOSPITAL | Age: 70
End: 2025-02-25
Payer: MEDICARE

## 2025-02-25 DIAGNOSIS — N17.9 ACUTE RENAL FAILURE, UNSPECIFIED ACUTE RENAL FAILURE TYPE: ICD-10-CM

## 2025-02-25 DIAGNOSIS — N17.9 ACUTE RENAL FAILURE, UNSPECIFIED ACUTE RENAL FAILURE TYPE: Primary | ICD-10-CM

## 2025-02-25 PROCEDURE — 80069 RENAL FUNCTION PANEL: CPT

## 2025-02-25 PROCEDURE — 81001 URINALYSIS AUTO W/SCOPE: CPT

## 2025-02-25 PROCEDURE — 84156 ASSAY OF PROTEIN URINE: CPT

## 2025-02-25 PROCEDURE — 85025 COMPLETE CBC W/AUTO DIFF WBC: CPT

## 2025-02-25 PROCEDURE — 36415 COLL VENOUS BLD VENIPUNCTURE: CPT

## 2025-02-25 PROCEDURE — 82570 ASSAY OF URINE CREATININE: CPT

## 2025-02-26 LAB
ALBUMIN SERPL-MCNC: 4.2 G/DL (ref 3.5–5.2)
ANION GAP SERPL CALCULATED.3IONS-SCNC: 12.5 MMOL/L (ref 5–15)
BACTERIA UR QL AUTO: ABNORMAL /HPF
BASOPHILS # BLD AUTO: 0.05 10*3/MM3 (ref 0–0.2)
BASOPHILS NFR BLD AUTO: 0.5 % (ref 0–1.5)
BILIRUB UR QL STRIP: NEGATIVE
BUN SERPL-MCNC: 22 MG/DL (ref 8–23)
BUN/CREAT SERPL: 13.9 (ref 7–25)
CALCIUM SPEC-SCNC: 9.8 MG/DL (ref 8.6–10.5)
CHLORIDE SERPL-SCNC: 97 MMOL/L (ref 98–107)
CLARITY UR: CLEAR
CO2 SERPL-SCNC: 26.5 MMOL/L (ref 22–29)
COLOR UR: YELLOW
CREAT SERPL-MCNC: 1.58 MG/DL (ref 0.57–1)
CREAT UR-MCNC: 112.8 MG/DL
DEPRECATED RDW RBC AUTO: 45.3 FL (ref 37–54)
EGFRCR SERPLBLD CKD-EPI 2021: 35.7 ML/MIN/1.73
EOSINOPHIL # BLD AUTO: 0.65 10*3/MM3 (ref 0–0.4)
EOSINOPHIL NFR BLD AUTO: 5.9 % (ref 0.3–6.2)
ERYTHROCYTE [DISTWIDTH] IN BLOOD BY AUTOMATED COUNT: 13.5 % (ref 12.3–15.4)
GLUCOSE SERPL-MCNC: 130 MG/DL (ref 65–99)
GLUCOSE UR STRIP-MCNC: NEGATIVE MG/DL
HCT VFR BLD AUTO: 37.6 % (ref 34–46.6)
HGB BLD-MCNC: 11.8 G/DL (ref 12–15.9)
HGB UR QL STRIP.AUTO: NEGATIVE
HYALINE CASTS UR QL AUTO: ABNORMAL /LPF
IMM GRANULOCYTES # BLD AUTO: 0.06 10*3/MM3 (ref 0–0.05)
IMM GRANULOCYTES NFR BLD AUTO: 0.5 % (ref 0–0.5)
KETONES UR QL STRIP: NEGATIVE
LEUKOCYTE ESTERASE UR QL STRIP.AUTO: ABNORMAL
LYMPHOCYTES # BLD AUTO: 2.22 10*3/MM3 (ref 0.7–3.1)
LYMPHOCYTES NFR BLD AUTO: 20.2 % (ref 19.6–45.3)
MCH RBC QN AUTO: 28.9 PG (ref 26.6–33)
MCHC RBC AUTO-ENTMCNC: 31.4 G/DL (ref 31.5–35.7)
MCV RBC AUTO: 92.2 FL (ref 79–97)
MONOCYTES # BLD AUTO: 0.64 10*3/MM3 (ref 0.1–0.9)
MONOCYTES NFR BLD AUTO: 5.8 % (ref 5–12)
NEUTROPHILS NFR BLD AUTO: 67.1 % (ref 42.7–76)
NEUTROPHILS NFR BLD AUTO: 7.37 10*3/MM3 (ref 1.7–7)
NITRITE UR QL STRIP: NEGATIVE
NRBC BLD AUTO-RTO: 0 /100 WBC (ref 0–0.2)
PH UR STRIP.AUTO: 6.5 [PH] (ref 5–8)
PHOSPHATE SERPL-MCNC: 3.7 MG/DL (ref 2.5–4.5)
PLATELET # BLD AUTO: 328 10*3/MM3 (ref 140–450)
PMV BLD AUTO: 10.6 FL (ref 6–12)
POTASSIUM SERPL-SCNC: 4.7 MMOL/L (ref 3.5–5.2)
PROT ?TM UR-MCNC: 52.1 MG/DL
PROT UR QL STRIP: ABNORMAL
RBC # BLD AUTO: 4.08 10*6/MM3 (ref 3.77–5.28)
RBC # UR STRIP: ABNORMAL /HPF
REF LAB TEST METHOD: ABNORMAL
SODIUM SERPL-SCNC: 136 MMOL/L (ref 136–145)
SP GR UR STRIP: 1.01 (ref 1–1.03)
SQUAMOUS #/AREA URNS HPF: ABNORMAL /HPF
UROBILINOGEN UR QL STRIP: ABNORMAL
WBC # UR STRIP: ABNORMAL /HPF
WBC NRBC COR # BLD AUTO: 10.99 10*3/MM3 (ref 3.4–10.8)

## 2025-04-01 ENCOUNTER — SPECIALTY PHARMACY (OUTPATIENT)
Dept: PHARMACY | Facility: HOSPITAL | Age: 70
End: 2025-04-01
Payer: MEDICARE

## 2025-04-01 DIAGNOSIS — I25.10 ASCVD (ARTERIOSCLEROTIC CARDIOVASCULAR DISEASE): Primary | ICD-10-CM

## 2025-04-01 RX ORDER — BUPROPION HYDROCHLORIDE 300 MG/1
300 TABLET ORAL DAILY
COMMUNITY
Start: 2025-01-30

## 2025-04-01 RX ORDER — INCLISIRAN 284 MG/1.5ML
284 INJECTION, SOLUTION SUBCUTANEOUS
Qty: 1.5 ML | Refills: 0 | Status: SHIPPED | OUTPATIENT
Start: 2025-04-01

## 2025-04-01 RX ORDER — EZETIMIBE 10 MG/1
10 TABLET ORAL NIGHTLY
COMMUNITY
Start: 2025-02-17

## 2025-04-01 NOTE — PROGRESS NOTES
[facility]ication Management Clinic  Lipid Management Program - Leqvio (Inclisiran)     Annette Galarza  is a 70 y.o. female referred by their provider, Dr. Lin, to the Hyperlipidemia Patient Management program offered by [facility] for Lipid Management.  Annette Galarza is  treated for clinical ASCVD and currently takes Zetia 10 mg.  In the past, Pt has tried Lipitor and Crestor and is statin intolerant due to myalgias and swelling.  Pt denies allergies to latex.    Patient presents to clinic today for first Leqvio injection.      Drug Coverage   Prescription Drug Coverage    Relevant Past Medical History and Comorbidities  Past Medical History:   Diagnosis Date    Anemia     Arthritis     ASCVD (arteriosclerotic cardiovascular disease) 2025    Asthma     Diabetic peripheral neuropathy     Esophageal reflux     Gout     Hyperlipidemia     Hypertension     Primary central sleep apnea     Rheumatoid arthritis     Urinary incontinence      Social History     Socioeconomic History    Marital status:    Tobacco Use    Smoking status: Former     Current packs/day: 0.00     Average packs/day: 1 pack/day for 35.8 years (35.8 ttl pk-yrs)     Types: Cigarettes     Start date: 1971     Quit date: 2007     Years since quittin.7     Passive exposure: Past    Smokeless tobacco: Never   Vaping Use    Vaping status: Never Used   Substance and Sexual Activity    Alcohol use: No    Drug use: No    Sexual activity: Not Currently     Partners: Male     Birth control/protection: Condom, Diaphragm, I.U.D., Tubal ligation, Birth control pill, Hysterectomy, Surgical         Allergies  Known allergies and reactions were discussed with the patient. The patient's chart has been reviewed for  allergy information and updated as necessary.   Allergies   Allergen Reactions    Meperidine Delirium and Hallucinations    Morphine Delirium, Irritability and Hallucinations     Adhesive Tape Itching, Dermatitis and Rash       Relevant Laboratory Values  Lab Results   Component Value Date    CHOL 310 (H) 06/29/2022    TRIG 671 (H) 06/29/2022    HDL 35 (L) 06/29/2022     (H) 06/29/2022       Current Medication List    Current Outpatient Medications:     albuterol sulfate HFA (Ventolin HFA) 108 (90 Base) MCG/ACT inhaler, Inhale 2 puffs Every 4 (Four) Hours As Needed for Wheezing., Disp: 18 g, Rfl: 0    allopurinol (ZYLOPRIM) 100 MG tablet, Take 1 tablet by mouth Every Night., Disp: , Rfl:     aspirin 81 MG tablet, Take 1 tablet by mouth Daily., Disp: , Rfl:     buPROPion XL (WELLBUTRIN XL) 300 MG 24 hr tablet, Take 1 tablet by mouth Daily., Disp: , Rfl:     carvedilol (COREG) 3.125 MG tablet, Take 2 tablets by mouth 2 (Two) Times a Day With Meals., Disp: , Rfl:     cloNIDine (CATAPRES) 0.1 MG tablet, Take 1 tablet by mouth 2 (Two) Times a Day As Needed for High Blood Pressure., Disp: , Rfl: 0    DULoxetine (CYMBALTA) 60 MG capsule, Take 1 capsule by mouth Daily., Disp: , Rfl:     ezetimibe (ZETIA) 10 MG tablet, Take 1 tablet by mouth Every Night., Disp: , Rfl:     hydrALAZINE (APRESOLINE) 50 MG tablet, Take 1.5 tablets by mouth 2 (Two) Times a Day. Will take TID if BP is elevated, Disp: , Rfl:     meclizine (ANTIVERT) 12.5 MG tablet, Take 1 tablet by mouth 3 (Three) Times a Day As Needed for Dizziness or Nausea., Disp: , Rfl:     melatonin 5 MG tablet tablet, Take 2 tablets by mouth Every Night., Disp: , Rfl:     montelukast (SINGULAIR) 10 MG tablet, Take 1 tablet by mouth Daily As Needed (allergies)., Disp: , Rfl:     nitroglycerin (NITROSTAT) 0.4 MG SL tablet, Place 1 tablet under the tongue Every 5 (Five) Minutes As Needed for Chest Pain (if systolic BP greater than 100 mm/Hg.)., Disp: 30 tablet, Rfl: 12    olmesartan-hydrochlorothiazide (BENICAR HCT) 40-12.5 MG per tablet, , Disp: , Rfl:     Ozempic, 0.25 or 0.5 MG/DOSE, 2 MG/3ML solution pen-injector, Inject 0.25 mg under the skin  into the appropriate area as directed 1 (One) Time Per Week. Sunday, Disp: , Rfl:     tiZANidine (ZANAFLEX) 4 MG tablet, Take 1 tablet by mouth Every Night., Disp: , Rfl: 0    dilTIAZem CD (Cardizem CD) 120 MG 24 hr capsule, Take 1 capsule by mouth Daily., Disp: , Rfl:     Inclisiran Sodium (Leqvio) 284 MG/1.5ML solution prefilled syringe, Inject 1.5 mL under the skin into the appropriate area as directed Every 3 (Three) Months., Disp: 1.5 mL, Rfl: 0    Current Facility-Administered Medications:     Inclisiran Sodium solution prefilled syringe 284 mg, 284 mg, Subcutaneous, Once, Fernando Wynn MD    Medicines reviewed by Irma Triplett, PharmD on 4/1/2025 at  1:50 PM  Medicines reviewed by Irma Triplett PharmD on 4/1/2025 at  1:50 PM  Medicines reviewed by Irma Triplett PharmD on 4/1/2025 at  1:52 PM    Drug Interactions  No significant drug-drug interactions expected with Leqvio according to literature     Goals of Therapy  LDL Reduction     Medication Assessment & Plan  Patient will begin Leqvio 284mg SC once, 3 months later, then every 6 months.  Administered Leqvio 284mg SUBQ in the left side of the stomach.    1st Dose:      Medication education provided, including:   Common Adverse Effects: Injection site reactions, arthralgias, & bronchitis.  Potential for allergic reaction.  Go to ED/call 911 with any S/Sx of allergic reaction.   Must be administered by a HCP.  If a dose is missed, please call to reschedule.   Expect LDL reduction, to help reduce cardiovascular risk.   At each visit, patient will need to stay a minimum of 15 min for monitoring   Lipid panel will be checked 4 to 12 weeks after starting therapy, order placed.   Patient will continue regular follow-up with cardiology  Will follow up in 3 months for next injection.     Irma Lancaster. Ioana, [place]  4/1/2025  14:09 EDT

## 2025-04-01 NOTE — PROGRESS NOTES
Medication Management Clinic  Lipid Management Program - Leqvio (Inclisiran)     Annette Galarza  is a 70 y.o. female referred by their provider, Dr. Lin, to the Hyperlipidemia Patient Management program offered by [facility] for Lipid Management.  Annette Galarza is  treated for clinical ASCVD and currently takes Zetia 10 mg.  In the past, Pt has tried Lipitor and Crestor and is statin intolerant due to myalgias and swelling.  Pt denies allergies to latex.    Patient presents to clinic today for first Leqvio injection.      Drug Coverage   Prescription Drug Coverage    Relevant Past Medical History and Comorbidities  Past Medical History:   Diagnosis Date    Anemia     Arthritis     ASCVD (arteriosclerotic cardiovascular disease) 2025    Asthma     Diabetic peripheral neuropathy     Esophageal reflux     Gout     Hyperlipidemia     Hypertension     Primary central sleep apnea     Rheumatoid arthritis     Urinary incontinence      Social History     Socioeconomic History    Marital status:    Tobacco Use    Smoking status: Former     Current packs/day: 0.00     Average packs/day: 1 pack/day for 35.8 years (35.8 ttl pk-yrs)     Types: Cigarettes     Start date: 1971     Quit date: 2007     Years since quittin.7     Passive exposure: Past    Smokeless tobacco: Never   Vaping Use    Vaping status: Never Used   Substance and Sexual Activity    Alcohol use: No    Drug use: No    Sexual activity: Not Currently     Partners: Male     Birth control/protection: Condom, Diaphragm, I.U.D., Tubal ligation, Birth control pill, Hysterectomy, Surgical         Allergies  Known allergies and reactions were discussed with the patient. The patient's chart has been reviewed for  allergy information and updated as necessary.   Allergies   Allergen Reactions    Meperidine Delirium and Hallucinations    Morphine Delirium, Irritability and Hallucinations     Adhesive Tape Itching, Dermatitis and Rash       Relevant Laboratory Values  Lab Results   Component Value Date    CHOL 310 (H) 06/29/2022    TRIG 671 (H) 06/29/2022    HDL 35 (L) 06/29/2022     (H) 06/29/2022       Current Medication List    Current Outpatient Medications:     albuterol sulfate HFA (Ventolin HFA) 108 (90 Base) MCG/ACT inhaler, Inhale 2 puffs Every 4 (Four) Hours As Needed for Wheezing., Disp: 18 g, Rfl: 0    allopurinol (ZYLOPRIM) 100 MG tablet, Take 1 tablet by mouth Every Night., Disp: , Rfl:     aspirin 81 MG tablet, Take 1 tablet by mouth Daily., Disp: , Rfl:     buPROPion XL (WELLBUTRIN XL) 300 MG 24 hr tablet, Take 1 tablet by mouth Daily., Disp: , Rfl:     carvedilol (COREG) 3.125 MG tablet, Take 2 tablets by mouth 2 (Two) Times a Day With Meals., Disp: , Rfl:     cloNIDine (CATAPRES) 0.1 MG tablet, Take 1 tablet by mouth 2 (Two) Times a Day As Needed for High Blood Pressure., Disp: , Rfl: 0    dilTIAZem CD (Cardizem CD) 120 MG 24 hr capsule, Take 1 capsule by mouth Daily., Disp: , Rfl:     DULoxetine (CYMBALTA) 60 MG capsule, Take 1 capsule by mouth Daily., Disp: , Rfl:     ezetimibe (ZETIA) 10 MG tablet, Take 1 tablet by mouth Every Night., Disp: , Rfl:     hydrALAZINE (APRESOLINE) 50 MG tablet, Take 1.5 tablets by mouth 2 (Two) Times a Day. Will take TID if BP is elevated, Disp: , Rfl:     Inclisiran Sodium (Leqvio) 284 MG/1.5ML solution prefilled syringe, Inject 1.5 mL under the skin into the appropriate area as directed Every 3 (Three) Months., Disp: 1.5 mL, Rfl: 0    meclizine (ANTIVERT) 12.5 MG tablet, Take 1 tablet by mouth 3 (Three) Times a Day As Needed for Dizziness or Nausea., Disp: , Rfl:     melatonin 5 MG tablet tablet, Take 2 tablets by mouth Every Night., Disp: , Rfl:     montelukast (SINGULAIR) 10 MG tablet, Take 1 tablet by mouth Daily As Needed (allergies)., Disp: , Rfl:     nitroglycerin (NITROSTAT) 0.4 MG SL tablet, Place 1 tablet under the tongue Every 5 (Five)  Minutes As Needed for Chest Pain (if systolic BP greater than 100 mm/Hg.)., Disp: 30 tablet, Rfl: 12    olmesartan-hydrochlorothiazide (BENICAR HCT) 40-12.5 MG per tablet, , Disp: , Rfl:     Ozempic, 0.25 or 0.5 MG/DOSE, 2 MG/3ML solution pen-injector, Inject 0.25 mg under the skin into the appropriate area as directed 1 (One) Time Per Week. Sunday, Disp: , Rfl:     tiZANidine (ZANAFLEX) 4 MG tablet, Take 1 tablet by mouth Every Night., Disp: , Rfl: 0    Current Facility-Administered Medications:     Inclisiran Sodium solution prefilled syringe 284 mg, 284 mg, Subcutaneous, Once, Fernando Wynn MD         Drug Interactions  No significant drug-drug interactions expected with Leqvio according to literature     Goals of Therapy  LDL Reduction     Medication Assessment & Plan  Patient will begin Leqvio 284mg SC once, 3 months later, then every 6 months.  Administered Leqvio 284mg SUBQ in the left side of the stomach.    1st Dose:      Medication education provided, including:   Common Adverse Effects: Injection site reactions, arthralgias, & bronchitis.  Potential for allergic reaction.  Go to ED/call 911 with any S/Sx of allergic reaction.   Must be administered by a HCP.  If a dose is missed, please call to reschedule.   Expect LDL reduction, to help reduce cardiovascular risk.   At each visit, patient will need to stay a minimum of 15 min for monitoring   Lipid panel will be checked 4 to 12 weeks after starting therapy, order placed.   Patient will continue regular follow-up with cardiology  Will follow up in 3 months for next injection.     Irma Lancaster. Ioana, PharmD  4/1/2025  14:20 EDT

## 2025-05-28 ENCOUNTER — TRANSCRIBE ORDERS (OUTPATIENT)
Dept: ADMINISTRATIVE | Facility: HOSPITAL | Age: 70
End: 2025-05-28
Payer: MEDICARE

## 2025-05-28 ENCOUNTER — LAB (OUTPATIENT)
Dept: LAB | Facility: HOSPITAL | Age: 70
End: 2025-05-28
Payer: MEDICARE

## 2025-05-28 DIAGNOSIS — N18.32 CHRONIC KIDNEY DISEASE (CKD) STAGE G3B/A1, MODERATELY DECREASED GLOMERULAR FILTRATION RATE (GFR) BETWEEN 30-44 ML/MIN/1.73 SQUARE METER AND ALBUMINURIA CREATININE RATIO LESS THAN 30 MG/G (CMS/H*: Primary | ICD-10-CM

## 2025-05-28 DIAGNOSIS — I25.10 ASCVD (ARTERIOSCLEROTIC CARDIOVASCULAR DISEASE): ICD-10-CM

## 2025-05-28 DIAGNOSIS — N18.32 CHRONIC KIDNEY DISEASE (CKD) STAGE G3B/A1, MODERATELY DECREASED GLOMERULAR FILTRATION RATE (GFR) BETWEEN 30-44 ML/MIN/1.73 SQUARE METER AND ALBUMINURIA CREATININE RATIO LESS THAN 30 MG/G (CMS/H*: ICD-10-CM

## 2025-05-28 PROCEDURE — 80061 LIPID PANEL: CPT

## 2025-05-28 PROCEDURE — 82570 ASSAY OF URINE CREATININE: CPT

## 2025-05-28 PROCEDURE — 80048 BASIC METABOLIC PNL TOTAL CA: CPT

## 2025-05-28 PROCEDURE — 81001 URINALYSIS AUTO W/SCOPE: CPT

## 2025-05-28 PROCEDURE — 36415 COLL VENOUS BLD VENIPUNCTURE: CPT

## 2025-05-28 PROCEDURE — 84156 ASSAY OF PROTEIN URINE: CPT

## 2025-05-29 LAB
ANION GAP SERPL CALCULATED.3IONS-SCNC: 10.2 MMOL/L (ref 5–15)
BACTERIA UR QL AUTO: ABNORMAL /HPF
BILIRUB UR QL STRIP: NEGATIVE
BUN SERPL-MCNC: 19 MG/DL (ref 8–23)
BUN/CREAT SERPL: 13.6 (ref 7–25)
CALCIUM SPEC-SCNC: 10 MG/DL (ref 8.6–10.5)
CHLORIDE SERPL-SCNC: 99 MMOL/L (ref 98–107)
CHOLEST SERPL-MCNC: 107 MG/DL (ref 0–200)
CLARITY UR: CLEAR
CO2 SERPL-SCNC: 27.8 MMOL/L (ref 22–29)
COLOR UR: YELLOW
CREAT SERPL-MCNC: 1.4 MG/DL (ref 0.57–1)
CREAT UR-MCNC: 85.7 MG/DL
EGFRCR SERPLBLD CKD-EPI 2021: 41.1 ML/MIN/1.73
GLUCOSE SERPL-MCNC: 103 MG/DL (ref 65–99)
GLUCOSE UR STRIP-MCNC: NEGATIVE MG/DL
HDLC SERPL-MCNC: 39 MG/DL (ref 40–60)
HGB UR QL STRIP.AUTO: NEGATIVE
HYALINE CASTS UR QL AUTO: ABNORMAL /LPF
KETONES UR QL STRIP: NEGATIVE
LDLC SERPL CALC-MCNC: 34 MG/DL (ref 0–100)
LDLC/HDLC SERPL: 0.65 {RATIO}
LEUKOCYTE ESTERASE UR QL STRIP.AUTO: ABNORMAL
NITRITE UR QL STRIP: NEGATIVE
PH UR STRIP.AUTO: 7 [PH] (ref 5–8)
POTASSIUM SERPL-SCNC: 4.7 MMOL/L (ref 3.5–5.2)
PROT ?TM UR-MCNC: 64.8 MG/DL
PROT UR QL STRIP: ABNORMAL
RBC # UR STRIP: ABNORMAL /HPF
REF LAB TEST METHOD: ABNORMAL
SODIUM SERPL-SCNC: 137 MMOL/L (ref 136–145)
SP GR UR STRIP: 1.01 (ref 1–1.03)
SQUAMOUS #/AREA URNS HPF: ABNORMAL /HPF
TRIGL SERPL-MCNC: 214 MG/DL (ref 0–150)
UROBILINOGEN UR QL STRIP: ABNORMAL
VLDLC SERPL-MCNC: 34 MG/DL (ref 5–40)
WBC # UR STRIP: ABNORMAL /HPF

## 2025-06-27 ENCOUNTER — SPECIALTY PHARMACY (OUTPATIENT)
Dept: PHARMACY | Facility: HOSPITAL | Age: 70
End: 2025-06-27
Payer: MEDICARE

## 2025-06-27 NOTE — PROGRESS NOTES
Specialty Pharmacy Patient Management Program  Refill Outreach     Annette was contacted today regarding refills of their medication(s).    Refill Questions      Flowsheet Row Most Recent Value   Changes to allergies? No   Changes to medications? No   New conditions or infections since last clinic visit No   Unplanned office visit, urgent care, ED, or hospital admission in the last 4 weeks  No   How does patient/caregiver feel medication is working? Good   Financial problems or insurance changes  No   Since the previous refill, were any specialty medication doses or scheduled injections missed or delayed?  No   Does this patient require a clinical escalation to a pharmacist? No            Delivery Questions      Flowsheet Row Most Recent Value   Delivery method  at Pharmacy   Delivery address Prescription   Medication(s) being filled and delivered Inclisiran Sodium (Leqvio)   Copay verified? Yes   Copay amount $0   Copay form of payment No copayment ($0)   Signature Required No                 Follow-up: 180 day(s)     Brea Vizcaino, Pharmacy Technician  6/27/2025  13:09 EDT

## 2025-07-08 ENCOUNTER — SPECIALTY PHARMACY (OUTPATIENT)
Dept: PHARMACY | Facility: HOSPITAL | Age: 70
End: 2025-07-08
Payer: MEDICARE

## 2025-07-08 RX ORDER — INCLISIRAN 284 MG/1.5ML
1.5 INJECTION, SOLUTION SUBCUTANEOUS
Qty: 1.5 ML | Refills: 0 | Status: SHIPPED | OUTPATIENT
Start: 2025-07-08

## 2025-07-08 RX ORDER — INCLISIRAN 284 MG/1.5ML
1.5 INJECTION, SOLUTION SUBCUTANEOUS
Qty: 1.5 ML | Refills: 0 | Status: SHIPPED | OUTPATIENT
Start: 2025-07-08 | End: 2025-07-08 | Stop reason: SDUPTHER

## 2025-07-08 RX ORDER — CARVEDILOL 6.25 MG/1
6.25 TABLET ORAL 2 TIMES DAILY WITH MEALS
COMMUNITY

## 2025-07-08 NOTE — PROGRESS NOTES
Medication Management Clinic  Lipid Management Program - Leqvio (Inclisiran)     Annette Galarza  is a 70 y.o. female referred by their provider, Dr. Lin, to the Hyperlipidemia Patient Management program offered by [facility] for Lipid Management.  Annette Galarza is  treated for clinical ASCVD and currently takes Zetia 10 mg.  In the past, Pt has tried Lipitor and Crestor and is statin intolerant due to myalgias and swelling.  Pt denies allergies to latex.    Patient reports tolerating her first dose well.  She receives injections in clinic. Patient was not the best historian for her medication list, but all changes have been noted.    Drug Coverage   Prescription Drug Coverage    Relevant Past Medical History and Comorbidities  Past Medical History:   Diagnosis Date    Anemia     Arthritis     ASCVD (arteriosclerotic cardiovascular disease) 2025    Asthma     Diabetic peripheral neuropathy     Esophageal reflux     Gout     Hyperlipidemia     Hypertension     Primary central sleep apnea     Rheumatoid arthritis     Urinary incontinence      Social History     Socioeconomic History    Marital status:    Tobacco Use    Smoking status: Former     Current packs/day: 0.00     Average packs/day: 1 pack/day for 35.8 years (35.8 ttl pk-yrs)     Types: Cigarettes     Start date: 1971     Quit date: 2007     Years since quittin.0     Passive exposure: Past    Smokeless tobacco: Never   Vaping Use    Vaping status: Never Used   Substance and Sexual Activity    Alcohol use: No    Drug use: No    Sexual activity: Not Currently     Partners: Male     Birth control/protection: Condom, Diaphragm, I.U.D., Tubal ligation, Birth control pill, Hysterectomy, Surgical         Allergies  Known allergies and reactions were discussed with the patient. The patient's chart has been reviewed for  allergy information and updated as necessary.   Allergies    Allergen Reactions    Meperidine Delirium and Hallucinations    Morphine Delirium, Irritability and Hallucinations    Adhesive Tape Itching, Dermatitis and Rash       Relevant Laboratory Values  Lab Results   Component Value Date    CHOL 107 05/28/2025    TRIG 214 (H) 05/28/2025    HDL 39 (L) 05/28/2025    LDL 34 05/28/2025       Current Medication List    Current Outpatient Medications:     albuterol sulfate HFA (Ventolin HFA) 108 (90 Base) MCG/ACT inhaler, Inhale 2 puffs Every 4 (Four) Hours As Needed for Wheezing., Disp: 18 g, Rfl: 0    allopurinol (ZYLOPRIM) 100 MG tablet, Take 1 tablet by mouth Every Night., Disp: , Rfl:     aspirin 81 MG tablet, Take 1 tablet by mouth Daily., Disp: , Rfl:     buPROPion XL (WELLBUTRIN XL) 300 MG 24 hr tablet, Take 1 tablet by mouth Daily., Disp: , Rfl:     carvedilol (COREG) 6.25 MG tablet, Take 1 tablet by mouth 2 (Two) Times a Day With Meals., Disp: , Rfl:     cloNIDine (CATAPRES) 0.1 MG tablet, Take 1 tablet by mouth 2 (Two) Times a Day As Needed for High Blood Pressure., Disp: , Rfl: 0    DULoxetine (CYMBALTA) 60 MG capsule, Take 1 capsule by mouth Daily., Disp: , Rfl:     ezetimibe (ZETIA) 10 MG tablet, Take 1 tablet by mouth Every Night., Disp: , Rfl:     hydrALAZINE (APRESOLINE) 50 MG tablet, Take 1.5 tablets by mouth 2 (Two) Times a Day. Will take TID if BP is elevated, Disp: , Rfl:     meclizine (ANTIVERT) 12.5 MG tablet, Take 1 tablet by mouth 3 (Three) Times a Day As Needed for Dizziness or Nausea., Disp: , Rfl:     melatonin 5 MG tablet tablet, Take 2 tablets by mouth Every Night., Disp: , Rfl:     montelukast (SINGULAIR) 10 MG tablet, Take 1 tablet by mouth Daily As Needed (allergies)., Disp: , Rfl:     nitroglycerin (NITROSTAT) 0.4 MG SL tablet, Place 1 tablet under the tongue Every 5 (Five) Minutes As Needed for Chest Pain (if systolic BP greater than 100 mm/Hg.)., Disp: 30 tablet, Rfl: 12    olmesartan-hydrochlorothiazide (BENICAR HCT) 40-12.5 MG per tablet,  Take 1 tablet by mouth Daily., Disp: , Rfl:     Ozempic, 0.25 or 0.5 MG/DOSE, 2 MG/3ML solution pen-injector, Inject 0.25 mg under the skin into the appropriate area as directed 1 (One) Time Per Week. Sunday, Disp: , Rfl:     tiZANidine (ZANAFLEX) 4 MG tablet, Take 1 tablet by mouth Every Night., Disp: , Rfl: 0    dilTIAZem CD (Cardizem CD) 120 MG 24 hr capsule, Take 1 capsule by mouth Daily., Disp: , Rfl:     Inclisiran Sodium (Leqvio) 284 MG/1.5ML solution prefilled syringe, Inject 1.5 mL under the skin into the appropriate area as directed Every 6 (Six) Months., Disp: 1.5 mL, Rfl: 0    Current Facility-Administered Medications:     Inclisiran Sodium solution prefilled syringe 284 mg, 284 mg, Subcutaneous, Once, Fernando Wynn MD    Medicines reviewed by Augustina Baxter, PharmD on 7/8/2025 at  2:02 PM    Drug Interactions  No significant drug-drug interactions expected with Leqvio according to literature     Goals of Therapy  LDL less than 55    7/8/25 [place]: LDL reduced from 145 to 34    Medication Assessment & Plan  Patient will begin Leqvio 284mg SC once, 3 months later, then every 6 months.  Administered Leqvio 284mg SUBQ in the back of the left arm.    1st Dose: 4/1/25    2nd dose: 7/8/25 (L: [id number], exp: 6/30/2027)  Medication education provided, including:   Common Adverse Effects: Injection site reactions, arthralgias, & bronchitis.  Potential for allergic reaction.  Go to ED/call 911 with any S/Sx of allergic reaction.   Must be administered by a HCP.  If a dose is missed, please call to reschedule.   Expect LDL reduction, to help reduce cardiovascular risk.   At each visit, patient will need to stay a minimum of 15 min for monitoring   LDL reduced from 145 to 34, at goal   Patient will continue regular follow-up with cardiology, next 8/18/25  Will follow up in 6 months for next injection.     Augustina Baxter PharmD  7/8/2025  14:09 EDT

## 2025-07-08 NOTE — PROGRESS NOTES
[facility]  Lipid Management Program - Leqvio (Inclisiran)     Annette Galarza  is a 70 y.o. female referred by their provider, Dr. Lin, to the Hyperlipidemia Patient Management program offered by [facility] for Lipid Management.  Annette Galarza is  treated for clinical ASCVD and currently takes Zetia 10 mg.  In the past, Pt has tried Lipitor and Crestor and is statin intolerant due to myalgias and swelling.  Pt denies allergies to latex.    Patient reports tolerating her first dose well.  She receives injections in clinic. Patient was not the best historian for her medication list, but all changes have been noted.    Drug Coverage   Prescription Drug Coverage    Relevant Past Medical History and Comorbidities  Past Medical History:   Diagnosis Date    Anemia     Arthritis     ASCVD (arteriosclerotic cardiovascular disease) 2025    Asthma     Diabetic peripheral neuropathy     Esophageal reflux     Gout     Hyperlipidemia     Hypertension     Primary central sleep apnea     Rheumatoid arthritis     Urinary incontinence      Social History     Socioeconomic History    Marital status:    Tobacco Use    Smoking status: Former     Current packs/day: 0.00     Average packs/day: 1 pack/day for 35.8 years (35.8 ttl pk-yrs)     Types: Cigarettes     Start date: 1971     Quit date: 2007     Years since quittin.0     Passive exposure: Past    Smokeless tobacco: Never   Vaping Use    Vaping status: Never Used   Substance and Sexual Activity    Alcohol use: No    Drug use: No    Sexual activity: Not Currently     Partners: Male     Birth control/protection: Condom, Diaphragm, I.U.D., Tubal ligation, Birth control pill, Hysterectomy, Surgical         Allergies  Known allergies and reactions were discussed with the patient. The patient's chart has been reviewed for  allergy information and updated as necessary.   Allergies    Allergen Reactions    Meperidine Delirium and Hallucinations    Morphine Delirium, Irritability and Hallucinations    Adhesive Tape Itching, Dermatitis and Rash       Relevant Laboratory Values  Lab Results   Component Value Date    CHOL 107 05/28/2025    TRIG 214 (H) 05/28/2025    HDL 39 (L) 05/28/2025    LDL 34 05/28/2025       Current Medication List    Current Outpatient Medications:     albuterol sulfate HFA (Ventolin HFA) 108 (90 Base) MCG/ACT inhaler, Inhale 2 puffs Every 4 (Four) Hours As Needed for Wheezing., Disp: 18 g, Rfl: 0    allopurinol (ZYLOPRIM) 100 MG tablet, Take 1 tablet by mouth Every Night., Disp: , Rfl:     aspirin 81 MG tablet, Take 1 tablet by mouth Daily., Disp: , Rfl:     buPROPion XL (WELLBUTRIN XL) 300 MG 24 hr tablet, Take 1 tablet by mouth Daily., Disp: , Rfl:     carvedilol (COREG) 6.25 MG tablet, Take 1 tablet by mouth 2 (Two) Times a Day With Meals., Disp: , Rfl:     cloNIDine (CATAPRES) 0.1 MG tablet, Take 1 tablet by mouth 2 (Two) Times a Day As Needed for High Blood Pressure., Disp: , Rfl: 0    dilTIAZem CD (Cardizem CD) 120 MG 24 hr capsule, Take 1 capsule by mouth Daily., Disp: , Rfl:     DULoxetine (CYMBALTA) 60 MG capsule, Take 1 capsule by mouth Daily., Disp: , Rfl:     ezetimibe (ZETIA) 10 MG tablet, Take 1 tablet by mouth Every Night., Disp: , Rfl:     hydrALAZINE (APRESOLINE) 50 MG tablet, Take 1.5 tablets by mouth 2 (Two) Times a Day. Will take TID if BP is elevated, Disp: , Rfl:     Inclisiran Sodium (Leqvio) 284 MG/1.5ML solution prefilled syringe, Inject 1.5 mL under the skin into the appropriate area as directed Every 6 (Six) Months., Disp: 1.5 mL, Rfl: 0    meclizine (ANTIVERT) 12.5 MG tablet, Take 1 tablet by mouth 3 (Three) Times a Day As Needed for Dizziness or Nausea., Disp: , Rfl:     melatonin 5 MG tablet tablet, Take 2 tablets by mouth Every Night., Disp: , Rfl:     montelukast (SINGULAIR) 10 MG tablet, Take 1 tablet by mouth Daily As Needed (allergies).,  Disp: , Rfl:     nitroglycerin (NITROSTAT) 0.4 MG SL tablet, Place 1 tablet under the tongue Every 5 (Five) Minutes As Needed for Chest Pain (if systolic BP greater than 100 mm/Hg.)., Disp: 30 tablet, Rfl: 12    olmesartan-hydrochlorothiazide (BENICAR HCT) 40-12.5 MG per tablet, Take 1 tablet by mouth Daily., Disp: , Rfl:     Ozempic, 0.25 or 0.5 MG/DOSE, 2 MG/3ML solution pen-injector, Inject 0.25 mg under the skin into the appropriate area as directed 1 (One) Time Per Week. Sunday, Disp: , Rfl:     tiZANidine (ZANAFLEX) 4 MG tablet, Take 1 tablet by mouth Every Night., Disp: , Rfl: 0    Current Facility-Administered Medications:     Inclisiran Sodium solution prefilled syringe 284 mg, 284 mg, Subcutaneous, Once, Fernando Wynn MD         Drug Interactions  No significant drug-drug interactions expected with Leqvio according to literature     Goals of Therapy  LDL less than 55    7/8/25 [place]: LDL reduced from 145 to 34    Medication Assessment & Plan  Patient will begin Leqvio 284mg SC once, 3 months later, then every 6 months.  Administered Leqvio 284mg SUBQ in the back of the left arm.    1st Dose: 4/1/25    2nd dose: 7/8/25 (L: [id number], exp: 6/30/2027)  Medication education provided, including:   Common Adverse Effects: Injection site reactions, arthralgias, & bronchitis.  Potential for allergic reaction.  Go to ED/call 911 with any S/Sx of allergic reaction.   Must be administered by a HCP.  If a dose is missed, please call to reschedule.   Expect LDL reduction, to help reduce cardiovascular risk.   At each visit, patient will need to stay a minimum of 15 min for monitoring   LDL reduced from 145 to 34, at goal   Patient will continue regular follow-up with cardiology, next 8/18/25  Will follow up in 6 months for next injection.     Augustina Baxter, PharmD  7/8/2025  14:12 EDT

## (undated) DEVICE — A2000 MULTI-USE SYRINGE KIT, P/N 701277-003KIT CONTENTS: 100ML CONTRAST RESERVOIR AND TUBING WITH CONTRAST SPIKE AND CLAMP: Brand: A2000 MULTI-USE SYRINGE KIT

## (undated) DEVICE — GLIDESHEATH SLENDER ACCESS KIT: Brand: GLIDESHEATH SLENDER

## (undated) DEVICE — ADULT, RADIOTRANSPARENT ELEMENT, COMPATIBLE W/ ZOLL: Brand: DEFIBRILLATION ELECTRODES

## (undated) DEVICE — HI-TORQUE BALANCE MIDDLEWEIGHT GUIDE WIRE W/HYDROCOAT .014 STRAIGHT TIP 3.0 CM X 190 CM: Brand: HI-TORQUE BALANCE MIDDLEWEIGHT

## (undated) DEVICE — DRSNG SURESITE WNDW 4X4.5

## (undated) DEVICE — Device

## (undated) DEVICE — MODEL AT P65, P/N 701554-001KIT CONTENTS: HAND CONTROLLER, 3-WAY HIGH-PRESSURE STOPCOCK WITH ROTATING END AND PREMIUM HIGH-PRESSURE TUBING: Brand: ANGIOTOUCH® KIT

## (undated) DEVICE — DRAPE, RADIAL, STERILE: Brand: MEDLINE

## (undated) DEVICE — ST INF PRI SMRTSTE 20DRP 2VLV 24ML 117

## (undated) DEVICE — RUNWAY RADL W/TOP PAD

## (undated) DEVICE — 40 MHZ CORONARY IMAGING CATHETER: Brand: OPTICROSS

## (undated) DEVICE — GW INQW FIX/CORE PTFE J/3MM .035 260CM

## (undated) DEVICE — RADIFOCUS OPTITORQUE ANGIOGRAPHIC CATHETER: Brand: OPTITORQUE

## (undated) DEVICE — CANNULA,OXY,ADULT,SUPER SOFT,W/14'TUB,UC: Brand: MEDLINE INDUSTRIES, INC.

## (undated) DEVICE — ST EXT IV SMARTSITE 2VLV SP M LL 5ML IV1

## (undated) DEVICE — DEV INFL MONARCH 25W

## (undated) DEVICE — 6F .070 JR 3.5 100CM: Brand: VISTA BRITE TIP

## (undated) DEVICE — ADULT DISPOSABLE SINGLE-PATIENT USE PULSE OXIMETER SENSOR: Brand: NONIN

## (undated) DEVICE — PK CATH CARD 70

## (undated) DEVICE — TR BAND RADIAL ARTERY COMPRESSION DEVICE: Brand: TR BAND

## (undated) DEVICE — NC TREK CORONARY DILATATION CATHETER 3.0 MM X 8 MM / RAPID-EXCHANGE: Brand: NC TREK